# Patient Record
Sex: FEMALE | Race: WHITE | NOT HISPANIC OR LATINO | Employment: FULL TIME | URBAN - METROPOLITAN AREA
[De-identification: names, ages, dates, MRNs, and addresses within clinical notes are randomized per-mention and may not be internally consistent; named-entity substitution may affect disease eponyms.]

---

## 2023-10-29 ENCOUNTER — HOSPITAL ENCOUNTER (EMERGENCY)
Facility: HOSPITAL | Age: 48
Discharge: HOME | End: 2023-10-30
Attending: INTERNAL MEDICINE
Payer: COMMERCIAL

## 2023-10-29 DIAGNOSIS — G43.809 OTHER MIGRAINE WITHOUT STATUS MIGRAINOSUS, NOT INTRACTABLE: Primary | ICD-10-CM

## 2023-10-29 PROCEDURE — 99284 EMERGENCY DEPT VISIT MOD MDM: CPT | Performed by: INTERNAL MEDICINE

## 2023-10-29 PROCEDURE — 99283 EMERGENCY DEPT VISIT LOW MDM: CPT | Mod: 25 | Performed by: INTERNAL MEDICINE

## 2023-10-29 PROCEDURE — 96372 THER/PROPH/DIAG INJ SC/IM: CPT | Performed by: INTERNAL MEDICINE

## 2023-10-29 RX ORDER — METOCLOPRAMIDE 10 MG/1
10 TABLET ORAL ONCE
Status: COMPLETED | OUTPATIENT
Start: 2023-10-30 | End: 2023-10-30

## 2023-10-29 RX ORDER — ACETAMINOPHEN 325 MG/1
975 TABLET ORAL ONCE
Status: COMPLETED | OUTPATIENT
Start: 2023-10-30 | End: 2023-10-30

## 2023-10-29 RX ORDER — KETOROLAC TROMETHAMINE 30 MG/ML
30 INJECTION, SOLUTION INTRAMUSCULAR; INTRAVENOUS ONCE
Status: COMPLETED | OUTPATIENT
Start: 2023-10-30 | End: 2023-10-30

## 2023-10-29 ASSESSMENT — COLUMBIA-SUICIDE SEVERITY RATING SCALE - C-SSRS
6. HAVE YOU EVER DONE ANYTHING, STARTED TO DO ANYTHING, OR PREPARED TO DO ANYTHING TO END YOUR LIFE?: NO
2. HAVE YOU ACTUALLY HAD ANY THOUGHTS OF KILLING YOURSELF?: NO
1. IN THE PAST MONTH, HAVE YOU WISHED YOU WERE DEAD OR WISHED YOU COULD GO TO SLEEP AND NOT WAKE UP?: NO

## 2023-10-30 VITALS
DIASTOLIC BLOOD PRESSURE: 101 MMHG | BODY MASS INDEX: 29.88 KG/M2 | TEMPERATURE: 97 F | RESPIRATION RATE: 17 BRPM | HEIGHT: 64 IN | OXYGEN SATURATION: 99 % | SYSTOLIC BLOOD PRESSURE: 152 MMHG | WEIGHT: 175 LBS | HEART RATE: 58 BPM

## 2023-10-30 PROCEDURE — 2500000004 HC RX 250 GENERAL PHARMACY W/ HCPCS (ALT 636 FOR OP/ED): Performed by: INTERNAL MEDICINE

## 2023-10-30 PROCEDURE — 2500000001 HC RX 250 WO HCPCS SELF ADMINISTERED DRUGS (ALT 637 FOR MEDICARE OP): Performed by: INTERNAL MEDICINE

## 2023-10-30 RX ORDER — METOCLOPRAMIDE 10 MG/1
10 TABLET ORAL 3 TIMES DAILY PRN
Qty: 15 TABLET | Refills: 0 | Status: SHIPPED | OUTPATIENT
Start: 2023-10-30 | End: 2023-11-06

## 2023-10-30 RX ADMIN — ACETAMINOPHEN 975 MG: 325 TABLET ORAL at 00:34

## 2023-10-30 RX ADMIN — METOCLOPRAMIDE 10 MG: 10 TABLET ORAL at 00:35

## 2023-10-30 RX ADMIN — KETOROLAC TROMETHAMINE 30 MG: 30 INJECTION, SOLUTION INTRAMUSCULAR at 00:35

## 2023-10-30 RX ADMIN — DEXAMETHASONE 10 MG: 6 TABLET ORAL at 00:34

## 2023-10-30 ASSESSMENT — PAIN - FUNCTIONAL ASSESSMENT
PAIN_FUNCTIONAL_ASSESSMENT: 0-10
PAIN_FUNCTIONAL_ASSESSMENT: 0-10

## 2023-10-30 ASSESSMENT — PAIN SCALES - GENERAL
PAINLEVEL_OUTOF10: 2
PAINLEVEL_OUTOF10: 5 - MODERATE PAIN

## 2023-10-30 ASSESSMENT — LIFESTYLE VARIABLES
EVER HAD A DRINK FIRST THING IN THE MORNING TO STEADY YOUR NERVES TO GET RID OF A HANGOVER: NO
REASON UNABLE TO ASSESS: NO
HAVE YOU EVER FELT YOU SHOULD CUT DOWN ON YOUR DRINKING: NO
HAVE PEOPLE ANNOYED YOU BY CRITICIZING YOUR DRINKING: NO
EVER FELT BAD OR GUILTY ABOUT YOUR DRINKING: NO

## 2023-10-30 ASSESSMENT — PAIN DESCRIPTION - PROGRESSION: CLINICAL_PROGRESSION: NOT CHANGED

## 2023-10-30 NOTE — DISCHARGE INSTRUCTIONS
Please follow-up with your neurologist.  If having worsening symptoms, intractable nausea vomiting, stiff neck, fever, vision change, or other concerns please return to the emergency room for further evaluation.

## 2023-10-30 NOTE — ED PROVIDER NOTES
"HPI   Chief Complaint   Patient presents with    Hypertension     Pt presents to the ED with c/o hypertension. No longer takes BP medication. Also c/o migraine \"but without pain\"       Patient presenting for evaluation of headache.  Patient notes that she gets this discomfort with her migraine headaches.  Patient feels that the pressure on the right side of her face.  This is similar to previous migraine headaches.  Patient denies nausea or vomiting.  Patient denies halos or auras around lights.  Patient notes that she took a Nurtec yesterday as well as a triptan today.  Patient notes that these did not relieve the pain.  Patient denies recent illness.  Denies runny nose.  Denies sinus pain.  Denies urinary complaints.  Patient notes she had a hysterectomy.  Patient denies recent trauma.  Patient denies chest pain or shortness of breath.  Patient denies vision change.  Patient notes that she does take propranolol for headaches.  Patient did take her propranolol this morning.  Patient notes her blood pressure was elevated today.      History provided by:  Patient                      No data recorded                Patient History   No past medical history on file.  Past Surgical History:   Procedure Laterality Date    OTHER SURGICAL HISTORY  02/03/2020    Gallbladder surgery     No family history on file.  Social History     Tobacco Use    Smoking status: Not on file    Smokeless tobacco: Not on file   Substance Use Topics    Alcohol use: Not on file    Drug use: Not on file       Physical Exam   ED Triage Vitals [10/29/23 2337]   Temp Heart Rate Resp BP   36.1 °C (97 °F) 61 18 171/83      SpO2 Temp src Heart Rate Source Patient Position   98 % -- -- --      BP Location FiO2 (%)     -- --       Physical Exam  Vitals and nursing note reviewed.   Constitutional:       Appearance: Normal appearance.   HENT:      Head: Atraumatic.      Right Ear: External ear normal.      Left Ear: External ear normal.      Nose: Nose " normal.      Mouth/Throat:      Mouth: Mucous membranes are moist.   Eyes:      Extraocular Movements: Extraocular movements intact.      Pupils: Pupils are equal, round, and reactive to light.   Cardiovascular:      Rate and Rhythm: Normal rate and regular rhythm.      Pulses: Normal pulses.   Pulmonary:      Effort: Pulmonary effort is normal.      Breath sounds: Normal breath sounds.   Abdominal:      Palpations: Abdomen is soft.      Tenderness: There is no abdominal tenderness.   Musculoskeletal:         General: No tenderness. Normal range of motion.      Cervical back: Normal range of motion and neck supple. No rigidity or tenderness.   Skin:     General: Skin is warm and dry.   Neurological:      General: No focal deficit present.      Mental Status: She is alert and oriented to person, place, and time. Mental status is at baseline.      Cranial Nerves: Cranial nerves 2-12 are intact.      Sensory: Sensation is intact.      Motor: Motor function is intact.      Coordination: Coordination is intact.   Psychiatric:         Mood and Affect: Mood normal.         Behavior: Behavior normal.         ED Course & MDM   ED Course as of 10/30/23 0122   Mon Oct 30, 2023   0111 Reassessed patient and her headache is improved.  Blood pressure improved.  Patient notes she has previously been prescribed Reglan and Compazine as an additive to help with headache.  Patient states that she did miss her injection which was scheduled for yesterday.  She believes this is contributing to her headache.  Patient agrees to follow-up with her neurologist at the [facility] and return to ED if having worsening symptoms or other concerns. [JA]      ED Course User Index  [JA] Joss Rubin DO         Diagnoses as of 10/30/23 0122   Other migraine without status migrainosus, not intractable       Medical Decision Making  Presenting for evaluation of headache similar to her previous migraines.  No neurodeficit on exam.  Afebrile.  No  nuchal rigidity.  No recent infections.  No recent trauma.  Improved in the ED with treatment.  Patient also noted to have high blood pressure.  Blood pressure improved after treatment of the migraine headache.  Patient does take propranolol that is for her headaches.  Patient denies chest pain, shortness of breath, mental status changes or vision changes.  Patient agrees to follow-up with her neurologist as outpatient as she could not fill her injection for her headaches.  Patient was supposed to have her injection yesterday.  She believes is contributing to her headache.  Patient agrees to follow-up as outpatient return to ED if having worsening symptoms or other concerns.        Procedure  Procedures     Joss Rubin DO  10/30/23 0122

## 2024-02-15 ENCOUNTER — HOSPITAL ENCOUNTER (EMERGENCY)
Facility: HOSPITAL | Age: 49
Discharge: HOME | End: 2024-02-16
Payer: COMMERCIAL

## 2024-02-15 DIAGNOSIS — R51.9 ACUTE NONINTRACTABLE HEADACHE, UNSPECIFIED HEADACHE TYPE: Primary | ICD-10-CM

## 2024-02-15 PROBLEM — Z94.9 TRANSPLANT: Status: ACTIVE | Noted: 2024-02-15

## 2024-02-15 PROBLEM — K21.9 GASTROESOPHAGEAL REFLUX DISEASE: Status: ACTIVE | Noted: 2017-10-09

## 2024-02-15 PROBLEM — K59.00 CONSTIPATION: Status: ACTIVE | Noted: 2024-02-15

## 2024-02-15 PROBLEM — S83.232A COMPLEX TEAR OF MEDIAL MENISCUS OF LEFT KNEE AS CURRENT INJURY: Status: ACTIVE | Noted: 2024-02-15

## 2024-02-15 PROBLEM — Z52.4 DONOR OF KIDNEY FOR TRANSPLANT: Status: ACTIVE | Noted: 2024-02-15

## 2024-02-15 PROBLEM — I10 HYPERTENSION: Status: ACTIVE | Noted: 2018-01-15

## 2024-02-15 PROBLEM — F41.9 ANXIETY: Status: ACTIVE | Noted: 2023-03-27

## 2024-02-15 PROBLEM — G25.81 RESTLESS LEG SYNDROME: Status: ACTIVE | Noted: 2022-03-28

## 2024-02-15 PROBLEM — N95.8 GENITOURINARY SYNDROME OF MENOPAUSE: Status: ACTIVE | Noted: 2023-01-30

## 2024-02-15 PROBLEM — E11.9 CONTROLLED TYPE 2 DIABETES MELLITUS WITHOUT COMPLICATION, WITHOUT LONG-TERM CURRENT USE OF INSULIN (MULTI): Status: ACTIVE | Noted: 2018-01-15

## 2024-02-15 PROBLEM — R07.9 CHEST PAIN: Status: ACTIVE | Noted: 2019-08-08

## 2024-02-15 PROBLEM — E78.2 MIXED HYPERLIPIDEMIA: Status: ACTIVE | Noted: 2019-08-08

## 2024-02-15 PROBLEM — G43.909 MIGRAINE HEADACHE: Status: ACTIVE | Noted: 2024-02-15

## 2024-02-15 PROBLEM — R00.2 PALPITATIONS: Status: ACTIVE | Noted: 2024-02-15

## 2024-02-15 LAB
BASOPHILS # BLD AUTO: 0.05 X10*3/UL (ref 0–0.1)
BASOPHILS NFR BLD AUTO: 0.5 %
EOSINOPHIL # BLD AUTO: 0.33 X10*3/UL (ref 0–0.7)
EOSINOPHIL NFR BLD AUTO: 3.1 %
ERYTHROCYTE [DISTWIDTH] IN BLOOD BY AUTOMATED COUNT: 12.9 % (ref 11.5–14.5)
ERYTHROCYTE [SEDIMENTATION RATE] IN BLOOD BY WESTERGREN METHOD: 28 MM/H (ref 0–20)
HCT VFR BLD AUTO: 42 % (ref 36–46)
HGB BLD-MCNC: 13.8 G/DL (ref 12–16)
IMM GRANULOCYTES # BLD AUTO: 0.04 X10*3/UL (ref 0–0.7)
IMM GRANULOCYTES NFR BLD AUTO: 0.4 % (ref 0–0.9)
LYMPHOCYTES # BLD AUTO: 2.72 X10*3/UL (ref 1.2–4.8)
LYMPHOCYTES NFR BLD AUTO: 25.2 %
MCH RBC QN AUTO: 29.6 PG (ref 26–34)
MCHC RBC AUTO-ENTMCNC: 32.9 G/DL (ref 32–36)
MCV RBC AUTO: 90 FL (ref 80–100)
MONOCYTES # BLD AUTO: 0.52 X10*3/UL (ref 0.1–1)
MONOCYTES NFR BLD AUTO: 4.8 %
NEUTROPHILS # BLD AUTO: 7.12 X10*3/UL (ref 1.2–7.7)
NEUTROPHILS NFR BLD AUTO: 66 %
NRBC BLD-RTO: 0 /100 WBCS (ref 0–0)
PLATELET # BLD AUTO: 264 X10*3/UL (ref 150–450)
RBC # BLD AUTO: 4.67 X10*6/UL (ref 4–5.2)
WBC # BLD AUTO: 10.8 X10*3/UL (ref 4.4–11.3)

## 2024-02-15 PROCEDURE — 86140 C-REACTIVE PROTEIN: CPT | Performed by: NURSE PRACTITIONER

## 2024-02-15 PROCEDURE — 80053 COMPREHEN METABOLIC PANEL: CPT | Performed by: NURSE PRACTITIONER

## 2024-02-15 PROCEDURE — 85652 RBC SED RATE AUTOMATED: CPT | Performed by: NURSE PRACTITIONER

## 2024-02-15 PROCEDURE — 99284 EMERGENCY DEPT VISIT MOD MDM: CPT

## 2024-02-15 PROCEDURE — 85025 COMPLETE CBC W/AUTO DIFF WBC: CPT | Performed by: NURSE PRACTITIONER

## 2024-02-15 PROCEDURE — 36415 COLL VENOUS BLD VENIPUNCTURE: CPT | Performed by: NURSE PRACTITIONER

## 2024-02-15 ASSESSMENT — PAIN - FUNCTIONAL ASSESSMENT: PAIN_FUNCTIONAL_ASSESSMENT: 0-10

## 2024-02-15 ASSESSMENT — COLUMBIA-SUICIDE SEVERITY RATING SCALE - C-SSRS
1. IN THE PAST MONTH, HAVE YOU WISHED YOU WERE DEAD OR WISHED YOU COULD GO TO SLEEP AND NOT WAKE UP?: NO
2. HAVE YOU ACTUALLY HAD ANY THOUGHTS OF KILLING YOURSELF?: NO
6. HAVE YOU EVER DONE ANYTHING, STARTED TO DO ANYTHING, OR PREPARED TO DO ANYTHING TO END YOUR LIFE?: NO

## 2024-02-15 ASSESSMENT — PAIN SCALES - GENERAL: PAINLEVEL_OUTOF10: 8

## 2024-02-16 ENCOUNTER — APPOINTMENT (OUTPATIENT)
Dept: RADIOLOGY | Facility: HOSPITAL | Age: 49
End: 2024-02-16
Payer: COMMERCIAL

## 2024-02-16 VITALS
WEIGHT: 190 LBS | DIASTOLIC BLOOD PRESSURE: 89 MMHG | RESPIRATION RATE: 17 BRPM | BODY MASS INDEX: 32.44 KG/M2 | HEART RATE: 74 BPM | SYSTOLIC BLOOD PRESSURE: 159 MMHG | OXYGEN SATURATION: 99 % | HEIGHT: 64 IN | TEMPERATURE: 97.7 F

## 2024-02-16 LAB
ALBUMIN SERPL BCP-MCNC: 4.5 G/DL (ref 3.4–5)
ALP SERPL-CCNC: 50 U/L (ref 33–110)
ALT SERPL W P-5'-P-CCNC: 20 U/L (ref 7–45)
ANION GAP SERPL CALC-SCNC: 13 MMOL/L (ref 10–20)
AST SERPL W P-5'-P-CCNC: 41 U/L (ref 9–39)
BILIRUB SERPL-MCNC: 0.4 MG/DL (ref 0–1.2)
BUN SERPL-MCNC: 14 MG/DL (ref 6–23)
CALCIUM SERPL-MCNC: 9.3 MG/DL (ref 8.6–10.3)
CHLORIDE SERPL-SCNC: 101 MMOL/L (ref 98–107)
CO2 SERPL-SCNC: 28 MMOL/L (ref 21–32)
CREAT SERPL-MCNC: 0.78 MG/DL (ref 0.5–1.05)
CRP SERPL-MCNC: 1.04 MG/DL
EGFRCR SERPLBLD CKD-EPI 2021: >90 ML/MIN/1.73M*2
GLUCOSE SERPL-MCNC: 141 MG/DL (ref 74–99)
POTASSIUM SERPL-SCNC: 3.8 MMOL/L (ref 3.5–5.3)
POTASSIUM SERPL-SCNC: 5.6 MMOL/L (ref 3.5–5.3)
PROT SERPL-MCNC: 8.2 G/DL (ref 6.4–8.2)
SODIUM SERPL-SCNC: 136 MMOL/L (ref 136–145)

## 2024-02-16 PROCEDURE — 2500000001 HC RX 250 WO HCPCS SELF ADMINISTERED DRUGS (ALT 637 FOR MEDICARE OP): Performed by: NURSE PRACTITIONER

## 2024-02-16 PROCEDURE — 70450 CT HEAD/BRAIN W/O DYE: CPT | Performed by: STUDENT IN AN ORGANIZED HEALTH CARE EDUCATION/TRAINING PROGRAM

## 2024-02-16 PROCEDURE — 70450 CT HEAD/BRAIN W/O DYE: CPT

## 2024-02-16 PROCEDURE — 84132 ASSAY OF SERUM POTASSIUM: CPT | Performed by: NURSE PRACTITIONER

## 2024-02-16 PROCEDURE — 36415 COLL VENOUS BLD VENIPUNCTURE: CPT | Performed by: NURSE PRACTITIONER

## 2024-02-16 RX ORDER — IBUPROFEN 600 MG/1
600 TABLET ORAL ONCE
Status: COMPLETED | OUTPATIENT
Start: 2024-02-16 | End: 2024-02-16

## 2024-02-16 RX ADMIN — IBUPROFEN 600 MG: 600 TABLET, FILM COATED ORAL at 01:21

## 2024-02-16 ASSESSMENT — LIFESTYLE VARIABLES
HAVE YOU EVER FELT YOU SHOULD CUT DOWN ON YOUR DRINKING: NO
HAVE PEOPLE ANNOYED YOU BY CRITICIZING YOUR DRINKING: NO
EVER FELT BAD OR GUILTY ABOUT YOUR DRINKING: NO
EVER HAD A DRINK FIRST THING IN THE MORNING TO STEADY YOUR NERVES TO GET RID OF A HANGOVER: NO

## 2024-02-16 ASSESSMENT — PAIN DESCRIPTION - LOCATION: LOCATION: HEAD

## 2024-02-16 ASSESSMENT — PAIN SCALES - GENERAL: PAINLEVEL_OUTOF10: 6

## 2024-02-16 ASSESSMENT — PAIN - FUNCTIONAL ASSESSMENT: PAIN_FUNCTIONAL_ASSESSMENT: 0-10

## 2024-02-16 NOTE — ED PROVIDER NOTES
HPI   Chief Complaint   Patient presents with    Headache       Patient is a healthy nontoxic-appearing 48-year-old female with past medical history of anxiety, migraine headaches, constipation, type 2 diabetes, donor kidney for transplant, esophageal reflux, hypertension, hyperlipidemia, palpitations, presents to the emergency today for complaint of pain behind the right ear.  Patient states she has a history of migraines and is not currently experiencing headache pain.  Patient states earlier today she developed pain behind the right ear and is concerned she may be experiencing an infection.  Patient denies any injuries trauma or falls, visual disturbances, numbness or tingling.  Patient denies any ear pain, hearing changes, drainage from the ear.  Patient denies chest pain, shortness of breath difficulty breathing, abdominal pain with nausea vomiting, diarrhea or constipation.  Patient denies contact with known ill individuals or recent travel.                          [place] Coma Scale Score: 15                     Patient History   History reviewed. No pertinent past medical history.  Past Surgical History:   Procedure Laterality Date    OTHER SURGICAL HISTORY  02/03/2020    Gallbladder surgery     No family history on file.  Social History     Tobacco Use    Smoking status: Not on file    Smokeless tobacco: Not on file   Substance Use Topics    Alcohol use: Not on file    Drug use: Not on file       Physical Exam   ED Triage Vitals [02/15/24 2256]   Temperature Heart Rate Respirations BP   36.5 °C (97.7 °F) 72 18 (!) 174/106      Pulse Ox Temp src Heart Rate Source Patient Position   100 % -- -- --      BP Location FiO2 (%)     -- --       Physical Exam  Vitals and nursing note reviewed.   Constitutional:       General: She is not in acute distress.     Appearance: Normal appearance. She is well-developed and normal weight. She is not ill-appearing, toxic-appearing or diaphoretic.   HENT:      Head:  Normocephalic and atraumatic.      Right Ear: Tympanic membrane, ear canal and external ear normal.      Left Ear: Tympanic membrane, ear canal and external ear normal.      Nose: Congestion and rhinorrhea present.      Mouth/Throat:      Mouth: Mucous membranes are moist.      Pharynx: Posterior oropharyngeal erythema present. No oropharyngeal exudate.   Eyes:      General: No visual field deficit or scleral icterus.        Right eye: No discharge.         Left eye: No discharge.      Extraocular Movements: Extraocular movements intact.      Right eye: Normal extraocular motion and no nystagmus.      Left eye: Normal extraocular motion and no nystagmus.      Conjunctiva/sclera: Conjunctivae normal.      Pupils: Pupils are equal, round, and reactive to light. Pupils are equal.      Right eye: Pupil is round and reactive.      Left eye: Pupil is round and reactive.   Neck:      Vascular: No carotid bruit.   Cardiovascular:      Rate and Rhythm: Normal rate and regular rhythm.      Pulses: Normal pulses.      Heart sounds: Normal heart sounds. No murmur heard.     No friction rub. No gallop.   Pulmonary:      Effort: Pulmonary effort is normal. No respiratory distress.      Breath sounds: Normal breath sounds. No stridor. No wheezing, rhonchi or rales.   Chest:      Chest wall: No tenderness.   Musculoskeletal:         General: No swelling. Normal range of motion.      Cervical back: Normal range of motion and neck supple. No rigidity or tenderness.   Lymphadenopathy:      Cervical: No cervical adenopathy.   Skin:     General: Skin is warm and dry.      Capillary Refill: Capillary refill takes less than 2 seconds.   Neurological:      Mental Status: She is alert.      GCS: GCS eye subscore is 4. GCS verbal subscore is 5. GCS motor subscore is 6.      Cranial Nerves: No cranial nerve deficit, dysarthria or facial asymmetry.      Sensory: No sensory deficit.      Motor: No weakness.      Coordination: Romberg sign  negative. Coordination normal.      Gait: Gait normal.      Deep Tendon Reflexes: Reflexes normal.   Psychiatric:         Mood and Affect: Mood normal.         ED Course & MDM   Diagnoses as of 02/16/24 0307   Acute nonintractable headache, unspecified headache type       Medical Decision Making  Given patient's complaint and presentation a thorough exam was performed.  Patient remains neurologically intact no focal deficits, has no nuchal rigidity, remains hemodynamically stable during emergency evaluation, is afebrile, GCS 15, denies any current headache pain, visual disturbances, pupils equal active round bilaterally, Red light reflex is intact, EOMs are intact bilaterally no nystagmus diplopia, no ataxia or drift, face is symmetrical, tongue is midline, able to shrug shoulders bilaterally, no weakness present bilateral upper or lower extremities, no midline cervical, thoracic or lumbar spinal tenderness and crepitus, step-offs upon palpation, full range of motion neck without any difficulty, no reproducible tenderness to palpation over the mastoid bone, no reproducible tenderness palpation over the temple, TMs bilaterally appear normal and intact no hemotympanum or effusion, I have a low suspicion for acute cranial process, meningitis, mastoiditis, otitis media, otitis externa, temporal arteritis.  Lab work was obtained in the emergency room. Lab work reveals several irregularities including elevated sed rate of 28, CRP of 1.04.  Patient states headache has been ongoing.  CT of the head was performed.  CT of the head is interpreted by radiologist reveals no acute intracranial process.  Patient did receive ibuprofen in the emergency room for discomfort as well.  Reevaluation of patient reveals some improvement.  I suspect patient may be experiencing migraine headache at this time.  Patient requesting discharge home.  I encourage patient to monitor symptoms and if they become worse was given strict precautions  return to emergency room for further evaluation, otherwise follow-up with primary care provider as needed.  Patient was agreeable this plan and discharged home in stable condition.    LUIZA Tamayo     Portions of this note were generated using digital voice recognition software, and may contain grammatical errors    Procedure  Procedures     LUIZA Tamayo  02/16/24 0300

## 2024-04-17 ENCOUNTER — HOSPITAL ENCOUNTER (EMERGENCY)
Facility: HOSPITAL | Age: 49
Discharge: HOME | End: 2024-04-17
Attending: EMERGENCY MEDICINE
Payer: COMMERCIAL

## 2024-04-17 ENCOUNTER — APPOINTMENT (OUTPATIENT)
Dept: CARDIOLOGY | Facility: HOSPITAL | Age: 49
End: 2024-04-17
Payer: COMMERCIAL

## 2024-04-17 VITALS
BODY MASS INDEX: 32.44 KG/M2 | HEIGHT: 64 IN | WEIGHT: 190 LBS | HEART RATE: 71 BPM | DIASTOLIC BLOOD PRESSURE: 84 MMHG | OXYGEN SATURATION: 100 % | TEMPERATURE: 97 F | RESPIRATION RATE: 16 BRPM | SYSTOLIC BLOOD PRESSURE: 149 MMHG

## 2024-04-17 DIAGNOSIS — H81.399 PERIPHERAL VERTIGO, UNSPECIFIED LATERALITY: Primary | ICD-10-CM

## 2024-04-17 LAB
ALBUMIN SERPL BCP-MCNC: 4.4 G/DL (ref 3.4–5)
ALP SERPL-CCNC: 62 U/L (ref 33–110)
ALT SERPL W P-5'-P-CCNC: 25 U/L (ref 7–45)
ANION GAP SERPL CALC-SCNC: 13 MMOL/L (ref 10–20)
AST SERPL W P-5'-P-CCNC: 16 U/L (ref 9–39)
BASOPHILS # BLD AUTO: 0.04 X10*3/UL (ref 0–0.1)
BASOPHILS NFR BLD AUTO: 0.4 %
BILIRUB SERPL-MCNC: 0.4 MG/DL (ref 0–1.2)
BUN SERPL-MCNC: 14 MG/DL (ref 6–23)
CALCIUM SERPL-MCNC: 9.3 MG/DL (ref 8.6–10.3)
CHLORIDE SERPL-SCNC: 100 MMOL/L (ref 98–107)
CO2 SERPL-SCNC: 28 MMOL/L (ref 21–32)
CREAT SERPL-MCNC: 0.89 MG/DL (ref 0.5–1.05)
EGFRCR SERPLBLD CKD-EPI 2021: 81 ML/MIN/1.73M*2
EOSINOPHIL # BLD AUTO: 0.28 X10*3/UL (ref 0–0.7)
EOSINOPHIL NFR BLD AUTO: 2.9 %
ERYTHROCYTE [DISTWIDTH] IN BLOOD BY AUTOMATED COUNT: 12.8 % (ref 11.5–14.5)
GLUCOSE SERPL-MCNC: 204 MG/DL (ref 74–99)
HCT VFR BLD AUTO: 38.5 % (ref 36–46)
HGB BLD-MCNC: 12.4 G/DL (ref 12–16)
IMM GRANULOCYTES # BLD AUTO: 0.02 X10*3/UL (ref 0–0.7)
IMM GRANULOCYTES NFR BLD AUTO: 0.2 % (ref 0–0.9)
LYMPHOCYTES # BLD AUTO: 2.08 X10*3/UL (ref 1.2–4.8)
LYMPHOCYTES NFR BLD AUTO: 21.8 %
MAGNESIUM SERPL-MCNC: 1.58 MG/DL (ref 1.6–2.4)
MCH RBC QN AUTO: 28.8 PG (ref 26–34)
MCHC RBC AUTO-ENTMCNC: 32.2 G/DL (ref 32–36)
MCV RBC AUTO: 89 FL (ref 80–100)
MONOCYTES # BLD AUTO: 0.38 X10*3/UL (ref 0.1–1)
MONOCYTES NFR BLD AUTO: 4 %
NEUTROPHILS # BLD AUTO: 6.73 X10*3/UL (ref 1.2–7.7)
NEUTROPHILS NFR BLD AUTO: 70.7 %
NRBC BLD-RTO: 0 /100 WBCS (ref 0–0)
PHOSPHATE SERPL-MCNC: 2.6 MG/DL (ref 2.5–4.9)
PLATELET # BLD AUTO: 245 X10*3/UL (ref 150–450)
POTASSIUM SERPL-SCNC: 3.5 MMOL/L (ref 3.5–5.3)
PROT SERPL-MCNC: 7.7 G/DL (ref 6.4–8.2)
RBC # BLD AUTO: 4.31 X10*6/UL (ref 4–5.2)
SODIUM SERPL-SCNC: 137 MMOL/L (ref 136–145)
WBC # BLD AUTO: 9.5 X10*3/UL (ref 4.4–11.3)

## 2024-04-17 PROCEDURE — 36415 COLL VENOUS BLD VENIPUNCTURE: CPT | Performed by: STUDENT IN AN ORGANIZED HEALTH CARE EDUCATION/TRAINING PROGRAM

## 2024-04-17 PROCEDURE — 99283 EMERGENCY DEPT VISIT LOW MDM: CPT | Mod: 25

## 2024-04-17 PROCEDURE — 2500000001 HC RX 250 WO HCPCS SELF ADMINISTERED DRUGS (ALT 637 FOR MEDICARE OP): Performed by: STUDENT IN AN ORGANIZED HEALTH CARE EDUCATION/TRAINING PROGRAM

## 2024-04-17 PROCEDURE — 2500000004 HC RX 250 GENERAL PHARMACY W/ HCPCS (ALT 636 FOR OP/ED): Performed by: STUDENT IN AN ORGANIZED HEALTH CARE EDUCATION/TRAINING PROGRAM

## 2024-04-17 PROCEDURE — 84100 ASSAY OF PHOSPHORUS: CPT | Performed by: STUDENT IN AN ORGANIZED HEALTH CARE EDUCATION/TRAINING PROGRAM

## 2024-04-17 PROCEDURE — 83735 ASSAY OF MAGNESIUM: CPT | Performed by: STUDENT IN AN ORGANIZED HEALTH CARE EDUCATION/TRAINING PROGRAM

## 2024-04-17 PROCEDURE — 80053 COMPREHEN METABOLIC PANEL: CPT | Performed by: STUDENT IN AN ORGANIZED HEALTH CARE EDUCATION/TRAINING PROGRAM

## 2024-04-17 PROCEDURE — 85025 COMPLETE CBC W/AUTO DIFF WBC: CPT | Performed by: STUDENT IN AN ORGANIZED HEALTH CARE EDUCATION/TRAINING PROGRAM

## 2024-04-17 PROCEDURE — 93005 ELECTROCARDIOGRAM TRACING: CPT

## 2024-04-17 PROCEDURE — 96360 HYDRATION IV INFUSION INIT: CPT

## 2024-04-17 RX ORDER — PHENTERMINE HYDROCHLORIDE 8 MG/1
8 TABLET ORAL DAILY
COMMUNITY

## 2024-04-17 RX ORDER — MECLIZINE HYDROCHLORIDE 25 MG/1
25 TABLET ORAL ONCE
Status: COMPLETED | OUTPATIENT
Start: 2024-04-17 | End: 2024-04-17

## 2024-04-17 RX ORDER — ACETAMINOPHEN 325 MG/1
975 TABLET ORAL ONCE
Status: COMPLETED | OUTPATIENT
Start: 2024-04-17 | End: 2024-04-17

## 2024-04-17 RX ORDER — LANOLIN ALCOHOL/MO/W.PET/CERES
800 CREAM (GRAM) TOPICAL ONCE
Status: COMPLETED | OUTPATIENT
Start: 2024-04-17 | End: 2024-04-17

## 2024-04-17 RX ORDER — VALSARTAN 40 MG/1
40 TABLET ORAL DAILY
COMMUNITY

## 2024-04-17 RX ORDER — CALCIUM CARBONATE 300MG(750)
400 TABLET,CHEWABLE ORAL DAILY
Qty: 30 TABLET | Refills: 0 | Status: SHIPPED | OUTPATIENT
Start: 2024-04-17 | End: 2024-05-17

## 2024-04-17 RX ADMIN — MAGNESIUM OXIDE TAB 400 MG (241.3 MG ELEMENTAL MG) 800 MG: 400 (241.3 MG) TAB at 22:18

## 2024-04-17 RX ADMIN — SODIUM CHLORIDE, POTASSIUM CHLORIDE, SODIUM LACTATE AND CALCIUM CHLORIDE 1000 ML: 600; 310; 30; 20 INJECTION, SOLUTION INTRAVENOUS at 21:20

## 2024-04-17 RX ADMIN — MECLIZINE HYDROCHLORIDE 25 MG: 25 TABLET ORAL at 21:20

## 2024-04-17 RX ADMIN — ACETAMINOPHEN 975 MG: 325 TABLET ORAL at 21:20

## 2024-04-17 ASSESSMENT — COLUMBIA-SUICIDE SEVERITY RATING SCALE - C-SSRS
1. IN THE PAST MONTH, HAVE YOU WISHED YOU WERE DEAD OR WISHED YOU COULD GO TO SLEEP AND NOT WAKE UP?: NO
6. HAVE YOU EVER DONE ANYTHING, STARTED TO DO ANYTHING, OR PREPARED TO DO ANYTHING TO END YOUR LIFE?: NO
2. HAVE YOU ACTUALLY HAD ANY THOUGHTS OF KILLING YOURSELF?: NO

## 2024-04-17 NOTE — ED TRIAGE NOTES
Pt arrived to the ED  wit c/o right side facial numbness and lightheaded. Pt states this has been going on for the last couple of hours.

## 2024-04-18 LAB
ATRIAL RATE: 56 BPM
P AXIS: 35 DEGREES
P OFFSET: 175 MS
P ONSET: 127 MS
PR INTERVAL: 182 MS
Q ONSET: 218 MS
QRS COUNT: 9 BEATS
QRS DURATION: 80 MS
QT INTERVAL: 426 MS
QTC CALCULATION(BAZETT): 411 MS
QTC FREDERICIA: 416 MS
R AXIS: -10 DEGREES
T AXIS: -31 DEGREES
T OFFSET: 431 MS
VENTRICULAR RATE: 56 BPM

## 2024-04-18 NOTE — ED PROVIDER NOTES
HPI   Chief Complaint   Patient presents with   • Dizziness     Pt arrived to the ED  wit c/o right side facial numbness and lightheaded. Pt states this has been going on for the last couple of hours.    • Numbness       HPI  48-year-old female with history of atypical migraine, anxiety, T2DM, hypertension who presents for lightheadedness and right facial numbness.  She states she first noticed the symptoms around 1800.  She denies any recent changes to her health other than recently starting phentermine which she takes for weight loss.  Denies any chest pain, shortness of breath, headache, blurry or double vision.  She states symptoms are worsened by position change.                  [place] Coma Scale Score: 15                     Patient History   History reviewed. No pertinent past medical history.  Past Surgical History:   Procedure Laterality Date   • OTHER SURGICAL HISTORY  02/03/2020    Gallbladder surgery     No family history on file.  Social History     Tobacco Use   • Smoking status: Every Day     Types: Cigarettes   • Smokeless tobacco: Never   Substance Use Topics   • Alcohol use: Never   • Drug use: Never       Physical Exam   ED Triage Vitals [04/17/24 1926]   Temperature Heart Rate Respirations BP   36.1 °C (97 °F) 62 16 (!) 189/109      Pulse Ox Temp Source Heart Rate Source Patient Position   99 % Temporal -- --      BP Location FiO2 (%)     -- --       Physical Exam  Vitals and nursing note reviewed.   Constitutional:       Appearance: Normal appearance.   HENT:      Head: Normocephalic.      Mouth/Throat:      Mouth: Mucous membranes are moist.   Eyes:      Conjunctiva/sclera: Conjunctivae normal.   Cardiovascular:      Rate and Rhythm: Normal rate.   Pulmonary:      Effort: Pulmonary effort is normal.   Abdominal:      General: Abdomen is flat.   Musculoskeletal:         General: No deformity.      Right lower leg: No edema.      Left lower leg: No edema.   Skin:     General: Skin is warm and  dry.   Neurological:      Mental Status: She is alert and oriented to person, place, and time.      Comments: Alert, oriented to person place time and situation, follows commands, moves all 4 extremities with symmetric 5-5 strength, no facial droop, no dysarthria, no aphasia, normal extraocular motion, sensation grossly intact throughout, normal gait, normal heel shin testing, normal finger-nose testing, no visual field cut   Psychiatric:         Mood and Affect: Mood normal.         ED Course & MDM   ED Course as of 04/17/24 2211 Wed Apr 17, 2024 2154 MAGNESIUM(!): 1.58  Replaced [ROOSEVELT]      ED Course User Index  [ROOSEVELT] Joss Deleon, DO         Diagnoses as of 04/17/24 2211   Peripheral vertigo, unspecified laterality       Medical Decision Making  48-year-old female with history of atypical migraine, anxiety, T2DM, hypertension who presents for lightheadedness and right facial numbness that began at 1800 this evening.  Only recent change to her health is initiation of a recent weight loss drug called phentermine.  On exam, patient is hypertensive, otherwise vitals normal, she is in no acute distress and nontoxic-appearing, she is neurologically intact including no dysmetria, no dysdiadochokinesia, no dysphagia, no dysarthria, no visual field cut.  Patient has normal sensation in V1, V2, V3 distributions and otherwise sensation is grossly intact throughout on my exam.  Symptoms are exacerbated with change in position on my exam consistent with more likely peripheral vertigo.  Epley maneuver attempted unsuccessfully.  We did proceed with basic labs and empirically treated with IV fluids, Tylenol, meclizine.    Blood work showed hyperglycemia as well as mild hypomagnesemia which was replaced.  On reassessment, patient's symptoms had improved.  I suspect peripheral vertigo and/or atypical migraine as a source of her symptoms and no suspicion for posterior circulation stroke.  She was given information on performing  the Epley maneuver at home, she felt well and was discharged home in stable condition.    Procedure  Procedures     Joss Deleon, DO  Resident  04/17/24 9964

## 2024-12-23 ENCOUNTER — APPOINTMENT (OUTPATIENT)
Dept: RADIOLOGY | Facility: HOSPITAL | Age: 49
End: 2024-12-23
Payer: COMMERCIAL

## 2024-12-23 ENCOUNTER — HOSPITAL ENCOUNTER (EMERGENCY)
Facility: HOSPITAL | Age: 49
Discharge: HOME | End: 2024-12-23
Attending: EMERGENCY MEDICINE
Payer: COMMERCIAL

## 2024-12-23 ENCOUNTER — APPOINTMENT (OUTPATIENT)
Dept: CARDIOLOGY | Facility: HOSPITAL | Age: 49
End: 2024-12-23
Payer: COMMERCIAL

## 2024-12-23 ENCOUNTER — HOSPITAL ENCOUNTER (EMERGENCY)
Dept: CARDIOLOGY | Facility: HOSPITAL | Age: 49
Discharge: HOME | End: 2024-12-23
Payer: COMMERCIAL

## 2024-12-23 VITALS
DIASTOLIC BLOOD PRESSURE: 100 MMHG | WEIGHT: 190 LBS | OXYGEN SATURATION: 98 % | BODY MASS INDEX: 32.44 KG/M2 | RESPIRATION RATE: 20 BRPM | HEIGHT: 64 IN | SYSTOLIC BLOOD PRESSURE: 158 MMHG | TEMPERATURE: 98.1 F | HEART RATE: 118 BPM

## 2024-12-23 DIAGNOSIS — R10.13 EPIGASTRIC PAIN: Primary | ICD-10-CM

## 2024-12-23 DIAGNOSIS — R07.9 CHEST PAIN, UNSPECIFIED: ICD-10-CM

## 2024-12-23 LAB
ALBUMIN SERPL BCP-MCNC: 4.5 G/DL (ref 3.4–5)
ALP SERPL-CCNC: 51 U/L (ref 33–110)
ALT SERPL W P-5'-P-CCNC: 16 U/L (ref 7–45)
ANION GAP SERPL CALC-SCNC: 13 MMOL/L (ref 10–20)
AST SERPL W P-5'-P-CCNC: 14 U/L (ref 9–39)
BASOPHILS # BLD AUTO: 0.01 X10*3/UL (ref 0–0.1)
BASOPHILS NFR BLD AUTO: 0.1 %
BILIRUB SERPL-MCNC: 0.8 MG/DL (ref 0–1.2)
BUN SERPL-MCNC: 16 MG/DL (ref 6–23)
CALCIUM SERPL-MCNC: 8.9 MG/DL (ref 8.6–10.3)
CARDIAC TROPONIN I PNL SERPL HS: <3 NG/L (ref 0–13)
CHLORIDE SERPL-SCNC: 101 MMOL/L (ref 98–107)
CO2 SERPL-SCNC: 24 MMOL/L (ref 21–32)
CREAT SERPL-MCNC: 0.71 MG/DL (ref 0.5–1.05)
EGFRCR SERPLBLD CKD-EPI 2021: >90 ML/MIN/1.73M*2
EOSINOPHIL # BLD AUTO: 0.09 X10*3/UL (ref 0–0.7)
EOSINOPHIL NFR BLD AUTO: 0.9 %
ERYTHROCYTE [DISTWIDTH] IN BLOOD BY AUTOMATED COUNT: 13.4 % (ref 11.5–14.5)
GLUCOSE SERPL-MCNC: 163 MG/DL (ref 74–99)
HCT VFR BLD AUTO: 41.6 % (ref 36–46)
HGB BLD-MCNC: 13.8 G/DL (ref 12–16)
IMM GRANULOCYTES # BLD AUTO: 0.05 X10*3/UL (ref 0–0.7)
IMM GRANULOCYTES NFR BLD AUTO: 0.5 % (ref 0–0.9)
LIPASE SERPL-CCNC: 33 U/L (ref 9–82)
LYMPHOCYTES # BLD AUTO: 1.04 X10*3/UL (ref 1.2–4.8)
LYMPHOCYTES NFR BLD AUTO: 10 %
MCH RBC QN AUTO: 28.8 PG (ref 26–34)
MCHC RBC AUTO-ENTMCNC: 33.2 G/DL (ref 32–36)
MCV RBC AUTO: 87 FL (ref 80–100)
MONOCYTES # BLD AUTO: 0.42 X10*3/UL (ref 0.1–1)
MONOCYTES NFR BLD AUTO: 4 %
NEUTROPHILS # BLD AUTO: 8.81 X10*3/UL (ref 1.2–7.7)
NEUTROPHILS NFR BLD AUTO: 84.5 %
NRBC BLD-RTO: 0 /100 WBCS (ref 0–0)
PLATELET # BLD AUTO: 228 X10*3/UL (ref 150–450)
POTASSIUM SERPL-SCNC: 3.5 MMOL/L (ref 3.5–5.3)
PROT SERPL-MCNC: 7.7 G/DL (ref 6.4–8.2)
RBC # BLD AUTO: 4.8 X10*6/UL (ref 4–5.2)
SODIUM SERPL-SCNC: 134 MMOL/L (ref 136–145)
WBC # BLD AUTO: 10.4 X10*3/UL (ref 4.4–11.3)

## 2024-12-23 PROCEDURE — 2500000001 HC RX 250 WO HCPCS SELF ADMINISTERED DRUGS (ALT 637 FOR MEDICARE OP)

## 2024-12-23 PROCEDURE — 99285 EMERGENCY DEPT VISIT HI MDM: CPT | Mod: 25 | Performed by: EMERGENCY MEDICINE

## 2024-12-23 PROCEDURE — 2500000005 HC RX 250 GENERAL PHARMACY W/O HCPCS

## 2024-12-23 PROCEDURE — 80053 COMPREHEN METABOLIC PANEL: CPT

## 2024-12-23 PROCEDURE — 2500000004 HC RX 250 GENERAL PHARMACY W/ HCPCS (ALT 636 FOR OP/ED)

## 2024-12-23 PROCEDURE — 74176 CT ABD & PELVIS W/O CONTRAST: CPT

## 2024-12-23 PROCEDURE — 83690 ASSAY OF LIPASE: CPT

## 2024-12-23 PROCEDURE — 2500000002 HC RX 250 W HCPCS SELF ADMINISTERED DRUGS (ALT 637 FOR MEDICARE OP, ALT 636 FOR OP/ED)

## 2024-12-23 PROCEDURE — 93005 ELECTROCARDIOGRAM TRACING: CPT

## 2024-12-23 PROCEDURE — 84484 ASSAY OF TROPONIN QUANT: CPT | Performed by: EMERGENCY MEDICINE

## 2024-12-23 PROCEDURE — 85025 COMPLETE CBC W/AUTO DIFF WBC: CPT

## 2024-12-23 PROCEDURE — 36415 COLL VENOUS BLD VENIPUNCTURE: CPT

## 2024-12-23 PROCEDURE — 74176 CT ABD & PELVIS W/O CONTRAST: CPT | Performed by: RADIOLOGY

## 2024-12-23 RX ORDER — FAMOTIDINE 20 MG/1
20 TABLET, FILM COATED ORAL 2 TIMES DAILY
Qty: 30 TABLET | Refills: 0 | Status: SHIPPED | OUTPATIENT
Start: 2024-12-23 | End: 2025-01-07

## 2024-12-23 RX ORDER — ONDANSETRON 4 MG/1
4 TABLET, ORALLY DISINTEGRATING ORAL ONCE
Status: COMPLETED | OUTPATIENT
Start: 2024-12-23 | End: 2024-12-23

## 2024-12-23 RX ORDER — FAMOTIDINE 20 MG/1
20 TABLET, FILM COATED ORAL ONCE
Status: COMPLETED | OUTPATIENT
Start: 2024-12-23 | End: 2024-12-23

## 2024-12-23 ASSESSMENT — COLUMBIA-SUICIDE SEVERITY RATING SCALE - C-SSRS
6. HAVE YOU EVER DONE ANYTHING, STARTED TO DO ANYTHING, OR PREPARED TO DO ANYTHING TO END YOUR LIFE?: NO
1. IN THE PAST MONTH, HAVE YOU WISHED YOU WERE DEAD OR WISHED YOU COULD GO TO SLEEP AND NOT WAKE UP?: NO
2. HAVE YOU ACTUALLY HAD ANY THOUGHTS OF KILLING YOURSELF?: NO

## 2024-12-23 NOTE — DISCHARGE INSTRUCTIONS
You were seen in the emergency department for abdominal pain and all of the lab tests and CT imaging did not reveal any abnormalities.  We recommend following up with your primary care provider if symptoms continue, for further evaluation.  Recommend to keep taking her PPI as prescribed and we have written you a prescription for Pepcid for further symptom relief.  Please come back to the emergency department if the pain is worsening or you have any concerns.

## 2024-12-23 NOTE — ED TRIAGE NOTES
Pt presents to ED c/o epigastric abdominal pain that began last night. Pt reports n/v/d and indigestion. Pt denies CP, SOB. Pt reports feeling heart racing.

## 2024-12-23 NOTE — ED PROVIDER NOTES
Emergency Department Provider Note        History of Present Illness     History provided by: Patient  Limitations to History: None  External Records Reviewed with Brief Summary: None    HPI:  Mallory Casillas is a 49 y.o. female presenting with one day history of abdominal pain, N/V/D.  She reports she had sudden onset of nausea vomiting that started last night around 10 PM.  She only had that single episode of vomiting.  She had a pizza couple hours before this all started. She also reports she started having diarrhea and epigastric pain at the same time.  Also describes feeling like her heart is racing.  But denies any chest pain, shortness of breath, fevers/chills.  Describes abdominal pain as sharp epigastric and constant 6/10 pain, momentarily relieved when she takes a sip of water.  Says she has not been able to eat since last night, but is able to drink water.     Physical Exam   Triage vitals:  T 36.7 °C (98.1 °F)  HR (!) 118  BP (!) 158/100  RR 20  O2 98 % None (Room air)    General: Awake, alert, in no acute distress  Eyes: Gaze conjugate.  No scleral icterus or injection  HENT: Normo-cephalic, atraumatic. No stridor  CV: Tachycardic rate, regular rhythm. Radial pulses 2+ bilaterally  Resp: Breathing non-labored, speaking in full sentences.  Clear to auscultation bilaterally  GI: Soft, non-distended, localized epigastric pain. No rebound or guarding.  MSK/Extremities: No gross bony deformities. Moving all extremities  Skin: Warm. Appropriate color  Neuro: Alert. Oriented. Face symmetric. Speech is fluent.  Gross strength and sensation intact in b/l UE and LEs    Medical Decision Making & ED Course   Medical Decision Makin y.o. female presenting with one day history of abdominal pain, N/V/D.  On exam patient is stable, not in distress.  Comfortable at rest.  She is mildly tachycardic, no chest pain or shortness of breath. Clinically does not appear to be significantly dehydrated.  This appears  most consistent with an acute episode of gastritis.  However further workup including CBC, CMP, lipase will be performed to assess for other intra-abdominal pathology.    CBC, CMP, and lipase with no significant abnormalities.  On reexamination, patient symptoms have improved slightly after receiving zofran pepcid and BMX. Patient is taking p.o.   Engaged in shared decision making with patient and order CT abdomen pelvis given that pain has not subsided and she is still tachycardic.    EKG with significant QTc elevation (657) on arrival, repeat EKG with improved QTc at 443.    CT abdomen with no acute intra-abdominal abnormality.   Discussed lack of findings with patient and discharge with prescription for Pepcid and recommend following up with her primary care provider for further evaluation of her epigastric.  Also advised to keep taking her PPI along with the Pepcid if needed.        ----      Differential diagnoses considered include but are not limited to: Gastroenteritis, pancreatitis, GERD, peptic ulcer disease, gastritis     Social Determinants of Health which Significantly Impact Care: None identified     EKG Independent Interpretation: The EKG obtained at 7;04 am was independently interpreted by myself. It demonstrates sinus tachycardia rhythm with a ventricular rate of 122. Normal axis. Intervals with evidence of Qtc prolongation at 657. ST segments showed no ischemic changes.    Independent Result Review and Interpretation: Relevant laboratory and radiographic results were reviewed and independently interpreted by myself.  As necessary, they are commented on in the ED Course.    Chronic conditions affecting the patient's care: As documented above in [facility]    The patient was discussed with the following consultants/services: None    Care Considerations: As documented above in [facility]    ED Course:  ED Course as of 12/23/24 1128   Mon Dec 23, 2024   1006 EKG interpreted by attending physician.  Sinus.  Rate 122.   No acute ischemic changes.  No ST elevation.  QTc prolonged at 657. [CD]   1009 Repeat EKG shows an improved QTc at 443.  Sinus rhythm.  Rate 81.  No acute ischemic changes.  No ST elevation. [CD]      ED Course User Index  [CD] Woo Car MD         Diagnoses as of 12/23/24 1128   Epigastric pain     Disposition   As a result of the work-up, the patient was discharged home.  she was informed of her diagnosis and instructed to come back with any concerns or worsening of condition.  she and was agreeable to the plan as discussed above.  she was given the opportunity to ask questions.  All of the patient's questions were answered.    Procedures   Procedures    Patient seen and discussed with ED attending physician.    Shivam Sevilla MD  Emergency Medicine      Shivam Sevilla MD  Resident  12/23/24 8346

## 2024-12-24 PROCEDURE — 93005 ELECTROCARDIOGRAM TRACING: CPT

## 2024-12-27 LAB
ATRIAL RATE: 124 BPM
ATRIAL RATE: 82 BPM
P AXIS: 43 DEGREES
P AXIS: 46 DEGREES
PR INTERVAL: 103 MS
PR INTERVAL: 177 MS
Q ONSET: 252 MS
Q ONSET: 252 MS
QRS COUNT: 14 BEATS
QRS COUNT: 19 BEATS
QRS DURATION: 83 MS
QRS DURATION: 93 MS
QT INTERVAL: 381 MS
QT INTERVAL: 461 MS
QTC CALCULATION(BAZETT): 443 MS
QTC CALCULATION(BAZETT): 657 MS
QTC FREDERICIA: 421 MS
QTC FREDERICIA: 583 MS
R AXIS: 10 DEGREES
R AXIS: 11 DEGREES
T AXIS: -2 DEGREES
T AXIS: 39 DEGREES
T OFFSET: 443 MS
T OFFSET: 482 MS
VENTRICULAR RATE: 122 BPM
VENTRICULAR RATE: 81 BPM

## 2025-02-13 ENCOUNTER — OFFICE VISIT (OUTPATIENT)
Dept: ORTHOPEDIC SURGERY | Facility: CLINIC | Age: 50
End: 2025-02-13
Payer: COMMERCIAL

## 2025-02-13 DIAGNOSIS — G89.29 CHRONIC LEFT SHOULDER PAIN: Primary | ICD-10-CM

## 2025-02-13 DIAGNOSIS — M75.82 ROTATOR CUFF TENDINITIS, LEFT: ICD-10-CM

## 2025-02-13 DIAGNOSIS — M25.512 CHRONIC LEFT SHOULDER PAIN: Primary | ICD-10-CM

## 2025-02-13 PROCEDURE — 4010F ACE/ARB THERAPY RXD/TAKEN: CPT

## 2025-02-13 PROCEDURE — 20610 DRAIN/INJ JOINT/BURSA W/O US: CPT | Mod: LT

## 2025-02-13 PROCEDURE — 2500000004 HC RX 250 GENERAL PHARMACY W/ HCPCS (ALT 636 FOR OP/ED)

## 2025-02-13 PROCEDURE — 99203 OFFICE O/P NEW LOW 30 MIN: CPT

## 2025-02-13 PROCEDURE — 99213 OFFICE O/P EST LOW 20 MIN: CPT | Mod: 25

## 2025-02-13 RX ORDER — TRIAMCINOLONE ACETONIDE 40 MG/ML
40 INJECTION, SUSPENSION INTRA-ARTICULAR; INTRAMUSCULAR
Status: COMPLETED | OUTPATIENT
Start: 2025-02-13 | End: 2025-02-13

## 2025-02-13 RX ORDER — LIDOCAINE HYDROCHLORIDE 20 MG/ML
2 INJECTION, SOLUTION INFILTRATION; PERINEURAL
Status: COMPLETED | OUTPATIENT
Start: 2025-02-13 | End: 2025-02-13

## 2025-02-13 RX ADMIN — TRIAMCINOLONE ACETONIDE 40 MG: 400 INJECTION, SUSPENSION INTRA-ARTICULAR; INTRAMUSCULAR at 12:50

## 2025-02-13 RX ADMIN — LIDOCAINE HYDROCHLORIDE 2 ML: 20 INJECTION, SOLUTION INFILTRATION; PERINEURAL at 12:50

## 2025-02-13 NOTE — PROGRESS NOTES
Subjective    Patient ID: Mallory aCsillas is a 49 y.o. female.    Chief Complaint: Pain of the Left Shoulder (NPV L SHOULDER PAIN X 4-6 MONTHS NKI/SEEN AT [facility] 11/17/24 XRAYS NEG)    Left Shoulder       This is a pleasant 49-year-old right-hand-dominant female presenting to the office for evaluation of left shoulder pain, which has been ongoing for approximately 6 months, worsening over the last few weeks.  There is been no known injury.  Patient states she was seen at the [facility] on November 17, 2024, where multiple view x-rays of her left shoulder were obtained, without evidence of acute fracture or dislocation.  She was advised to take Motrin and follow-up with orthopedics as needed.  Presents to the office today with complaints of left shoulder pain, specifically when overhead reaching and reaching behind her back.  She has noticed slight decrease in limited range of motion and weakness.  She has not noticed any swelling.  She denies numbness and paresthesia of the left upper extremity.  She has been taking Tylenol and anti-inflammatories with temporary relief of symptoms.  She works, mostly sedentary work at a desk.  But she is also a .  She has an upcoming tournament in [place] in 2 weeks.    The patient's past medical, surgical, family, and social history as well as allergies and medications were reviewed and updated in the chart.    Objective   Ortho Exam  Pleasant and no acute distress. Left shoulder normal appearance, no overlying skin changes, no muscle atrophy.  Left shoulder forward flexion 160°. No effusion or instability. There is positive Neer and Montero impingement. There is subacromial crepitus and tenderness around the subacromial space. No biceps tenderness. No acromioclavicular tenderness. Rotator cuff strength is intact but there is discomfort with strength testing. Right shoulder forward flexion 180°. No effusion or instability. No impingement or crepitus or  tenderness involving the subacromial space. No biceps or acromioclavicular tenderness. There is intact rotator cuff strength. Both upper extremities are well perfused, skin is intact and muscle tone is adequate. Elbow flexion and extension and wrist flexion and extension strength are intact. Sensation intact to light touch.    Image Results:  X-ray report of the left shoulder obtained on November 17, 2024 from the [facility] personally reviewed, without evidence of acute fracture or dislocation.  There is no acute osseous bony abnormality noted.    Assessment/Plan   Encounter Diagnoses:  Chronic left shoulder pain    Rotator cuff tendinitis, left    Plan: Discussion with patient in regards to left shoulder rotator cuff tendinitis with review of x-ray report from the [facility].  Conservative treatment options were discussed at length.  She will benefit from a formal physical therapy program to help with left shoulder strengthening and range of motion, referral provided.  After the risks and benefits of a left shoulder subacromial bursa injection of Kenalog/lidocaine was discussed, patient agreed to injection and tolerated well.  She can continue to take Motrin and Tylenol as well as apply ice.  She should avoid aggravating activities.  Explained to patient if she does not see significant relief of symptoms following conservative treatment options of physical therapy and injection, an MRI of the right shoulder could be indicated to assess for the integrity of the rotator cuff, assess for rotator cuff tear.  She will follow-up as symptoms dictate.    L Inj/Asp: L subacromial bursa on 2/13/2025 12:50 PM  Indications: pain  Details: 22 G needle, posterior approach  Medications: 40 mg triamcinolone acetonide 40 mg/mL; 2 mL lidocaine 20 mg/mL (2 %)  Procedure, treatment alternatives, risks and benefits explained, specific risks discussed. Consent was given by the patient.          Orders Placed This Encounter     Referral to Physical Therapy

## 2025-03-10 ENCOUNTER — HOSPITAL ENCOUNTER (OUTPATIENT)
Dept: RADIOLOGY | Facility: CLINIC | Age: 50
Discharge: HOME | End: 2025-03-10
Payer: COMMERCIAL

## 2025-03-10 ENCOUNTER — OFFICE VISIT (OUTPATIENT)
Dept: ORTHOPEDIC SURGERY | Facility: CLINIC | Age: 50
End: 2025-03-10
Payer: COMMERCIAL

## 2025-03-10 VITALS — BODY MASS INDEX: 32.44 KG/M2 | HEIGHT: 64 IN | WEIGHT: 190 LBS

## 2025-03-10 DIAGNOSIS — M25.511 RIGHT SHOULDER PAIN, UNSPECIFIED CHRONICITY: ICD-10-CM

## 2025-03-10 DIAGNOSIS — M75.82 TENDONITIS OF BOTH ROTATOR CUFFS: ICD-10-CM

## 2025-03-10 DIAGNOSIS — M25.512 LEFT SHOULDER PAIN, UNSPECIFIED CHRONICITY: ICD-10-CM

## 2025-03-10 DIAGNOSIS — M75.81 TENDONITIS OF BOTH ROTATOR CUFFS: ICD-10-CM

## 2025-03-10 PROCEDURE — 2500000004 HC RX 250 GENERAL PHARMACY W/ HCPCS (ALT 636 FOR OP/ED)

## 2025-03-10 PROCEDURE — 20610 DRAIN/INJ JOINT/BURSA W/O US: CPT | Mod: RT

## 2025-03-10 PROCEDURE — 99213 OFFICE O/P EST LOW 20 MIN: CPT | Mod: 25

## 2025-03-10 PROCEDURE — 73030 X-RAY EXAM OF SHOULDER: CPT | Mod: 50

## 2025-03-10 PROCEDURE — 4010F ACE/ARB THERAPY RXD/TAKEN: CPT

## 2025-03-10 PROCEDURE — 3008F BODY MASS INDEX DOCD: CPT

## 2025-03-10 PROCEDURE — 99213 OFFICE O/P EST LOW 20 MIN: CPT

## 2025-03-10 RX ADMIN — LIDOCAINE HYDROCHLORIDE 2 ML: 20 INJECTION, SOLUTION INFILTRATION; PERINEURAL at 14:00

## 2025-03-10 RX ADMIN — TRIAMCINOLONE ACETONIDE 40 MG: 40 INJECTION, SUSPENSION INTRA-ARTICULAR; INTRAMUSCULAR at 14:00

## 2025-03-11 RX ORDER — LIDOCAINE HYDROCHLORIDE 20 MG/ML
2 INJECTION, SOLUTION INFILTRATION; PERINEURAL
Status: COMPLETED | OUTPATIENT
Start: 2025-03-10 | End: 2025-03-10

## 2025-03-11 RX ORDER — TRIAMCINOLONE ACETONIDE 40 MG/ML
40 INJECTION, SUSPENSION INTRA-ARTICULAR; INTRAMUSCULAR
Status: COMPLETED | OUTPATIENT
Start: 2025-03-10 | End: 2025-03-10

## 2025-03-11 NOTE — PROGRESS NOTES
Subjective    Patient ID: Mallory Casillas is a 49 y.o. female.    Chief Complaint: Pain of the Right Shoulder and Pain of the Left Shoulder     Right Shoulder     Left Shoulder       This is a pleasant 49-year-old right-hand-dominant female presenting to the office for evaluation of right shoulder pain, which has been ongoing for approximately 3 months.  There is been no known injury.  I last saw patient in regards to left shoulder pain approximately 1 month ago.  She received a left shoulder subacromial bursa injection of Kenalog/lidocaine and states that left shoulder pain has improved.  She has now noticed increased pain in the right shoulder.  She points to her right shoulder and deltoid when describing the pain.  Pain is described as a constant dull ache, sharp shooting at times.  She has noticed slight decrease in range of motion and some degree of weakness.  She denies numbness and paresthesia of right upper extremity.  She has been taking Tylenol and anti-inflammatories with temporary relief of symptoms.  She works, sedentary work at a desk.  She is also a .  She likes to play weekly and does do tournaments on the weekend.    The patient's past medical, surgical, family, and social history as well as allergies and medications were reviewed and updated in the chart.    Objective   Ortho Exam  Pleasant and no acute distress. Right shoulder normal appearance, no overlying skin changes, no muscle atrophy.  Right shoulder forward flexion 160°. No effusion or instability. There is positive Neer and Montero impingement. There is subacromial crepitus and tenderness around the subacromial space. No biceps tenderness. No acromioclavicular tenderness. Rotator cuff strength is decreased and there is discomfort with strength testing. Left shoulder forward flexion 180°. No effusion or instability. No impingement or crepitus or tenderness involving the subacromial space. No biceps or acromioclavicular  tenderness. There is intact rotator cuff strength. There is adequate range of motion of the cervical spine without pain. Both upper extremities are well perfused, skin is intact and muscle tone is adequate. Elbow flexion and extension and wrist flexion and extension strength are intact. Sensation intact to light touch.    Image Results:  Multiple view x-rays of the right shoulder obtained today personally reviewed, without evidence of acute fracture or dislocation.  The glenohumeral and acromioclavicular joint spaces are well-maintained.    Assessment/Plan   Encounter Diagnoses:  Right shoulder pain, unspecified chronicity    Left shoulder pain, unspecified chronicity    Tendonitis of both rotator cuffs    Plan: Discussion with patient in regards to right shoulder rotator cuff tendinitis with review of today's x-rays.  Conservative treatment options were discussed at length.  After the risk and benefits of right shoulder subacromial bursa injection of Kenalog/lidocaine was discussed, patient agreed to injection and tolerated well.  She is aware that she can receive these injections every 3 months as needed.  I do think that patient could benefit from formal physical therapy in regards to both shoulders, referral to physical therapy was provided.  She can continue with Aleve and Tylenol.  She also applies IcyHot and ice as needed.  If patient does not see significant relief of symptoms following formal physical therapy and injections today, an MRI of the right or left shoulder would be indicated to assess for the integrity of the rotator cuff.  She will avoid aggravating activities and can follow-up as symptoms dictate in regards to both shoulders.    L Inj/Asp: R subacromial bursa on 3/10/2025 2:00 PM  Indications: pain  Details: 22 G needle, posterior approach  Medications: 40 mg triamcinolone acetonide 40 mg/mL; 2 mL lidocaine 20 mg/mL (2 %)  Procedure, treatment alternatives, risks and benefits explained,  specific risks discussed. Consent was given by the patient.            Orders Placed This Encounter    XR shoulder left 2+ views    Referral to Physical Therapy

## 2025-03-20 ENCOUNTER — DOCUMENTATION (OUTPATIENT)
Dept: PHYSICAL THERAPY | Facility: CLINIC | Age: 50
End: 2025-03-20
Payer: COMMERCIAL

## 2025-03-20 ENCOUNTER — APPOINTMENT (OUTPATIENT)
Dept: PHYSICAL THERAPY | Facility: CLINIC | Age: 50
End: 2025-03-20
Payer: COMMERCIAL

## 2025-03-20 NOTE — PROGRESS NOTES
Physical Therapy                 Therapy Communication Note    Patient Name: Mallory Casillas  MRN: [id number]  Department:   Room: Room/bed info not found  Today's Date: 3/20/2025     Discipline: Physical Therapy      Missed Time: Cancel    Comment: Pt canceled initial evaluation via [employer].

## 2025-04-09 ENCOUNTER — APPOINTMENT (OUTPATIENT)
Dept: PRIMARY CARE | Facility: CLINIC | Age: 50
End: 2025-04-09
Payer: COMMERCIAL

## 2025-04-09 VITALS — WEIGHT: 189 LBS | DIASTOLIC BLOOD PRESSURE: 94 MMHG | SYSTOLIC BLOOD PRESSURE: 112 MMHG | BODY MASS INDEX: 32.44 KG/M2

## 2025-04-09 DIAGNOSIS — Z86.69 HX OF MIGRAINE HEADACHES: ICD-10-CM

## 2025-04-09 DIAGNOSIS — E66.811 OBESITY, CLASS I, BMI 30-34.9: ICD-10-CM

## 2025-04-09 DIAGNOSIS — K21.9 GASTROESOPHAGEAL REFLUX DISEASE, UNSPECIFIED WHETHER ESOPHAGITIS PRESENT: ICD-10-CM

## 2025-04-09 DIAGNOSIS — Z12.11 COLON CANCER SCREENING: ICD-10-CM

## 2025-04-09 DIAGNOSIS — L70.0 ACNE VULGARIS: ICD-10-CM

## 2025-04-09 DIAGNOSIS — E11.65 TYPE 2 DIABETES MELLITUS WITH HYPERGLYCEMIA, WITHOUT LONG-TERM CURRENT USE OF INSULIN: ICD-10-CM

## 2025-04-09 DIAGNOSIS — F17.200 CURRENT SMOKER: ICD-10-CM

## 2025-04-09 DIAGNOSIS — I10 PRIMARY HYPERTENSION: ICD-10-CM

## 2025-04-09 DIAGNOSIS — R10.13 EPIGASTRIC ABDOMINAL PAIN: Primary | ICD-10-CM

## 2025-04-09 PROCEDURE — 4010F ACE/ARB THERAPY RXD/TAKEN: CPT | Performed by: STUDENT IN AN ORGANIZED HEALTH CARE EDUCATION/TRAINING PROGRAM

## 2025-04-09 PROCEDURE — 3080F DIAST BP >= 90 MM HG: CPT | Performed by: STUDENT IN AN ORGANIZED HEALTH CARE EDUCATION/TRAINING PROGRAM

## 2025-04-09 PROCEDURE — 3074F SYST BP LT 130 MM HG: CPT | Performed by: STUDENT IN AN ORGANIZED HEALTH CARE EDUCATION/TRAINING PROGRAM

## 2025-04-09 PROCEDURE — 99205 OFFICE O/P NEW HI 60 MIN: CPT | Performed by: STUDENT IN AN ORGANIZED HEALTH CARE EDUCATION/TRAINING PROGRAM

## 2025-04-09 RX ORDER — GALCANEZUMAB 120 MG/ML
120 INJECTION, SOLUTION SUBCUTANEOUS
COMMUNITY
Start: 2025-03-21 | End: 2025-09-17

## 2025-04-09 RX ORDER — VONOPRAZAN FUMARATE 13.36 MG/1
10 TABLET ORAL DAILY
Qty: 30 TABLET | Refills: 5 | Status: SHIPPED | OUTPATIENT
Start: 2025-04-09

## 2025-04-09 RX ORDER — PANTOPRAZOLE SODIUM 40 MG/1
TABLET, DELAYED RELEASE ORAL
COMMUNITY

## 2025-04-09 RX ORDER — SPIRONOLACTONE 100 MG/1
100 TABLET, FILM COATED ORAL
COMMUNITY
Start: 2025-01-23

## 2025-04-09 RX ORDER — ESTRADIOL 0.1 MG/D
1 FILM, EXTENDED RELEASE TRANSDERMAL 2 TIMES WEEKLY
COMMUNITY
Start: 2025-04-07

## 2025-04-09 RX ORDER — VALSARTAN 80 MG/1
80 TABLET ORAL DAILY
Qty: 90 TABLET | Refills: 3 | Status: SHIPPED | OUTPATIENT
Start: 2025-04-09 | End: 2026-04-09

## 2025-04-09 RX ORDER — DOXYCYCLINE HYCLATE 20 MG
20 TABLET ORAL 2 TIMES DAILY
COMMUNITY

## 2025-04-09 RX ORDER — ELETRIPTAN HYDROBROMIDE 40 MG/1
TABLET, FILM COATED ORAL
COMMUNITY

## 2025-04-09 RX ORDER — TIRZEPATIDE 2.5 MG/.5ML
2.5 INJECTION, SOLUTION SUBCUTANEOUS WEEKLY
Qty: 2 ML | Refills: 2 | Status: SHIPPED | OUTPATIENT
Start: 2025-04-09

## 2025-04-09 NOTE — PROGRESS NOTES
Subjective   Patient ID: Mallory Casillas is a 49 y.o. female who presents for Establish Care.  HPI  Mallory is here to establish care.    BP remains uncontrolled. Taking valsartan daily. 150/100 multiple times per week on home readings. Taking propranolol for migraine headaches. Taking spironolactone for acne as well. Established with dermatology. Establishing with new neurologist through .    She started having stabbing epigastric pain, worse with food. Improves when she is not eating. Tends to be constipated , ~3 bowel movements weekly. No hematochezia or melena. No fevers or weight changes recently. Hx of EGD ~2-3 years ago with [facility], EGD demonstrated some gastritis. She was previously taking a lot of NSAIDs for headaches, no longer using nsaids.    Has tried quitting smoking with chantix, nicotine patches.     Sleep is okay at times. Drinks caffeine in the morning. She is eating a balanced diet. Eating most meals at home. Drinking some water ~2 glasses. Trying to avoid salt in the diet.     PMHx: HTN, migraine headaches  SurgHx: cholecystectomy, hysterectomy, knee surgeries   FamHx: HTN, DM - mother  SocialHx: Current smoker (~1/2 ppd for 30 years), Never vaped. Rare social EtOH use. No drug use. Working at law firm. Lives at home with  and three children (27, 22, 19).     Review of Systems  12-point ROS was reviewed and is negative, unless otherwise noted in HPI    Objective   Vitals:    04/09/25 1303   BP: (!) 112/94      Physical Exam  GEN: alert, conversant, NAD  HEENT: PERRL, EOMI, MMM, Tms pearly gray bilaterally  NECK: supple, no LAD appreciated  CHEST: CTAB  CV: S1, S2, RRR, no murmurs appreciated  ABD: soft, NT, ND  EXT: no significant LE edema  SKIN: warm, dry    Assessment/Plan   # Diabetes Mellitus type 2  #obesity, class I  Most Recent HgbA1c: 6.4%  Had been following with weight management at [facility]  Previously on Metformin, Ozempic  - trial low dose mounjaro 2.5mg weekly  -  discussed SE profile. Discussed need for prophylactic bowel regimen.  Lipid panel, microalbumin checked less than a year ago.  Increase dietary efforts and physical activity.  Routine diabetic retinopathy screening, foot exam/monofilament testing screening: up-to-date.    # HTN  uncontrolled  Increase valsartan to 80mg daily  Discussed DASH diet and dietary sodium restrictions.   Increase dietary efforts and physical activity.     #refractory GERD  EGD ~2-3 years ago at [facility] with some gastritis per patietn report  - maintained on protonix 40mg daily without relief. Previosuly on omeprazole, esomeprazole, pepcid without relief.  - start voquenza 10mg daily  - referral to GI     #migraine headaches  - maintained on emgality monthly, propranolol daily  - establishing with  neurology in 9/2025    #acne  - maintained on spironolactone daily  - following with dermatology    #Current smoker  - 1/2 ppd smoker. 30 pack year hx  - Advised cessation, counseled cessation tips, offer cessation aids  - Spent >5 minutes counseling smoking cessation    Health Maintenance:  Vaccines: COVID (x3), Flu (sometimes), TDAP (2021), pneumonia (x1)  Screening: Colonoscopy (ordered), Mammogram (12/2024), Paptest (s/p hysterectomy)   Labs: Reviewed recent, repeat at next visit    I spent >60 minutes reviewing chart, visiting with patient, writing prescriptions and documenting in the chart.       RTC in 3 months, or sooner PRN    Aman Mims, DO

## 2025-04-10 ENCOUNTER — OFFICE VISIT (OUTPATIENT)
Dept: GASTROENTEROLOGY | Facility: CLINIC | Age: 50
End: 2025-04-10
Payer: COMMERCIAL

## 2025-04-10 VITALS
RESPIRATION RATE: 18 BRPM | BODY MASS INDEX: 32.33 KG/M2 | DIASTOLIC BLOOD PRESSURE: 80 MMHG | HEART RATE: 73 BPM | SYSTOLIC BLOOD PRESSURE: 120 MMHG | HEIGHT: 64 IN | OXYGEN SATURATION: 93 % | WEIGHT: 189.4 LBS

## 2025-04-10 DIAGNOSIS — R10.13 EPIGASTRIC ABDOMINAL PAIN: ICD-10-CM

## 2025-04-10 PROCEDURE — 3074F SYST BP LT 130 MM HG: CPT | Performed by: INTERNAL MEDICINE

## 2025-04-10 PROCEDURE — 99204 OFFICE O/P NEW MOD 45 MIN: CPT | Performed by: INTERNAL MEDICINE

## 2025-04-10 PROCEDURE — 3008F BODY MASS INDEX DOCD: CPT | Performed by: INTERNAL MEDICINE

## 2025-04-10 PROCEDURE — 3079F DIAST BP 80-89 MM HG: CPT | Performed by: INTERNAL MEDICINE

## 2025-04-10 PROCEDURE — 4010F ACE/ARB THERAPY RXD/TAKEN: CPT | Performed by: INTERNAL MEDICINE

## 2025-04-10 ASSESSMENT — ENCOUNTER SYMPTOMS
ABDOMINAL PAIN: 1
CONSTITUTIONAL NEGATIVE: 1
RESPIRATORY NEGATIVE: 1
NEUROLOGICAL NEGATIVE: 1
ENDOCRINE NEGATIVE: 1
CARDIOVASCULAR NEGATIVE: 1
NAUSEA: 1

## 2025-04-10 NOTE — PATIENT INSTRUCTIONS
Epigastric abdominal pain  -     Referral to Gastroenterology  -     Esophagogastroduodenoscopy (EGD); Future  Continue Pantoprazole 40mg oral daily.   Follow up based on the results.   Call with questions.

## 2025-04-10 NOTE — PROGRESS NOTES
Subjective   Patient ID: Mallory Casillas is a 49 y.o. female who presents for Abdominal Pain (Reports having epigastric pain that radiates to her back for the last week or two. Pt also has acid reflux with some nausea at times. No hx of EGD, denies family hx).  HPI  Patient is a 49-year-old woman with past medical history significant for migraine headache, hypertension.  Longstanding acid reflux disease for which she was on pantoprazole.    She started having stabbing epigastric pain, worse with food. Improves when she is not eating. Tends to be constipated , ~3 bowel movements weekly. No hematochezia or melena. No fevers or weight changes recently. Hx of EGD ~2-3 years ago with [facility], EGD demonstrated some gastritis. She was previously taking a lot of NSAIDs for headaches, no longer using nsaids.     CT scan abdominal pelvis December 2024 showed small liver cyst otherwise was essentially normal.  No previous colonoscopy.  She had a upper endoscopy at [facility] in 2020 I do not have results for review.  She is status postcholecystectomy. 10 years.   She is currently on Voquenza 10 mg oral once daily.Still waiting PA.  Taking Pnatoprazole in addition sh will take OTC pepcid,     Epigastric pain after she eats can stay the whole day. Radiate to the back. Going on for few months but now worse recently.  No previous colonoscopy currently has an upcoming one in May in [facility].  Denies family history of colon cancer.  Denies any weight loss.      Current Outpatient Medications on File Prior to Visit   Medication Sig Dispense Refill    doxycycline (Periostat) 20 mg tablet Take 1 tablet (20 mg) by mouth 2 times a day.      eletriptan (Relpax) 40 mg tablet TAKE 1 TABLET BY MOUTH AS NEEDED AT ONSET OF MIGRAINE. MAY REPEAT IN 2 HRS IF NEEDED. MAXIMUM USE 2 DAYS PER WK.      Emgality Pen 120 mg/mL auto-injector Inject 1 Syringe (120 mg) under the skin every 30 (thirty) days.      estradiol (Vivelle-DOT)  0.1 mg/24 hr patch Place 1 patch on the skin 2 times a week.      pantoprazole (ProtoNix) 40 mg EC tablet take 1 tablet BY MOUTH 30 TO 60 minutes before breakfast and dinner      propranolol XL (Innopran XL) 120 mg 24 hr capsule Take 1 capsule (120 mg) by mouth once daily. Do not crush, chew, or split.      spironolactone (Aldactone) 100 mg tablet Take 1 tablet (100 mg) by mouth once daily.      tirzepatide (Mounjaro) 2.5 mg/0.5 mL pen injector Inject 2.5 mg under the skin 1 (one) time per week. 2 mL 2    valsartan (Diovan) 80 mg tablet Take 1 tablet (80 mg) by mouth once daily. 90 tablet 3    vonoprazan (Voquezna) 10 mg tablet Take 10 mg by mouth once daily. 30 tablet 5    [DISCONTINUED] famotidine (Pepcid) 20 mg tablet Take 1 tablet (20 mg) by mouth 2 times a day for 15 days. 30 tablet 0    [DISCONTINUED] metoclopramide (Reglan) 10 mg tablet Take 1 tablet (10 mg) by mouth 3 times a day as needed (headache) for up to 7 days. 15 tablet 0    [DISCONTINUED] phentermine (Lomaira) 8 mg tablet Take 1 tablet (8 mg) by mouth once daily. (Patient not taking: Reported on 4/9/2025)      [DISCONTINUED] valsartan (Diovan) 40 mg tablet Take 1 tablet (40 mg) by mouth once daily.       No current facility-administered medications on file prior to visit.        Review of Systems   Constitutional: Negative.    Respiratory: Negative.     Cardiovascular: Negative.    Gastrointestinal:  Positive for abdominal pain and nausea.   Endocrine: Negative.    Genitourinary: Negative.    Skin: Negative.    Neurological: Negative.        Objective   Physical Exam  Constitutional:       Appearance: Normal appearance.   HENT:      Head: Normocephalic and atraumatic.   Cardiovascular:      Rate and Rhythm: Normal rate and regular rhythm.      Pulses: Normal pulses.      Heart sounds: Normal heart sounds.   Pulmonary:      Effort: Pulmonary effort is normal.      Breath sounds: Normal breath sounds.   Abdominal:      General: Abdomen is flat. Bowel  "sounds are normal.      Palpations: Abdomen is soft.      Tenderness: There is no abdominal tenderness.   Musculoskeletal:         General: Normal range of motion.      Cervical back: Normal range of motion.   Skin:     General: Skin is warm.   Neurological:      Mental Status: She is alert.     /80 (BP Location: Left arm, Patient Position: Sitting, BP Cuff Size: Adult)   Pulse 73   Resp 18   Ht 1.626 m (5' 4\")   Wt 85.9 kg (189 lb 6.4 oz)   SpO2 93%   BMI 32.51 kg/m²      Lab Results   Component Value Date    WBC 10.4 12/23/2024    HGB 13.8 12/23/2024    HCT 41.6 12/23/2024    MCV 87 12/23/2024     12/23/2024           No lab exists for component: \"LABALBU\"    No results found for: \"AFP\"  Lab Results   Component Value Date    TSH 1.42 05/15/2022     CT abdomen pelvis wo IV contrast  Status: Final result     PACS Images     Show images for CT abdomen pelvis wo IV contrast  Signed by    Signed Time Phone Pager   Florencio Baig MD 12/23/2024 10:35 [phone]      Exam Information    Status Exam Begun Exam Ended   Final 12/23/2024 09:49 12/23/2024 09:49     Study Result    Narrative & Impression   Interpreted By:  Florencio Baig,   STUDY:  CT ABDOMEN PELVIS WO IV CONTRAST; ;  12/23/2024 9:49 am      INDICATION:  Signs/Symptoms:abdominal pain.          COMPARISON:  12/13/2022      ACCESSION NUMBER(S):  [id number]      ORDERING CLINICIAN:  ABDULAZIZ STRATTON      TECHNIQUE:  Serial axial unenhanced CT images obtained of the abdomen and pelvis.  Images reformatted in the coronal and sagittal projection      FINDINGS:  Included lung bases are unremarkable. Distal esophagus is  unremarkable.      Liver demonstrates a hypodensity in the right lobe of the liver  segment 8 near the dome of the diaphragm identified measuring 1.7 cm  with average Hounsfield unit of 8 in keeping with hepatic cyst as  seen on prior imaging. Otherwise, liver is unremarkable within limits  of this unenhanced CT      Status " post cholecystectomy.      Spleen and adrenal glands are unremarkable.      Pancreas is unremarkable within limits of this unenhanced CT.      Right kidney demonstrates no hydronephrosis or nephrolithiasis.  Visualized course of the right ureter is unremarkable to the level of  the bladder.      Left kidney demonstrates no hydronephrosis or nephrolithiasis.  Visualized course of the left ureter is unremarkable. 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      Retroperitoneum demonstrates no lymphadenopathy. There is no ascites      Loops of large bowel are unremarkable. Appendix is seen and is  unremarkable. Small bowel loops are nondilated. There is no  lymphadenopathy in the mesentery. Stomach is distended with fluid  contents. There is a small umbilical hernia containing fat measuring  1.5 cm.      CT pelvis:      Unopacified bladder is unremarkable. There is no pelvic  lymphadenopathy. Status post hysterectomy. Visualized adnexa are  unremarkable.      Visualized osseous structures demonstrate no compression deformity in  the spine. Mild multilevel endplate degenerative changes lower  thoracic spine with anterior osteophyte formation. No narrowing of  the thecal sac.                      IMPRESSION:  1. No acute intra-abdominal abnormality. No dilated loops of bowel.      2. No obstructive uropathy. No nephrolithiasis.      3. Small umbilical hernia containing fat.       Assessment/Plan   Diagnoses and all orders for this visit:  Epigastric abdominal pain  -     Referral to Gastroenterology  -     Esophagogastroduodenoscopy (EGD); Future  Cross-sectional imaging has been negative, patient reports history of gastritis,.  No improvement in symptoms with PPI therapy.  Symptoms have been going on for 6 months however over the last few weeks have been quite severe.  We will proceed with endoscopy evaluation at this time.  If endoscopy is normal then this is likely functional in nature.  May need  further testing which may include gastric emptying study and breath test.    Continue Pantoprazole 40mg oral daily.   Follow up based on the results.   Call with questions.

## 2025-04-15 DIAGNOSIS — E11.65 TYPE 2 DIABETES MELLITUS WITH HYPERGLYCEMIA, WITHOUT LONG-TERM CURRENT USE OF INSULIN: Primary | ICD-10-CM

## 2025-04-15 DIAGNOSIS — R10.13 EPIGASTRIC ABDOMINAL PAIN: ICD-10-CM

## 2025-04-15 DIAGNOSIS — K21.9 GASTROESOPHAGEAL REFLUX DISEASE, UNSPECIFIED WHETHER ESOPHAGITIS PRESENT: ICD-10-CM

## 2025-04-15 RX ORDER — DEXLANSOPRAZOLE 30 MG/1
30 CAPSULE, DELAYED RELEASE ORAL DAILY
Qty: 30 CAPSULE | Refills: 5 | Status: SHIPPED | OUTPATIENT
Start: 2025-04-15 | End: 2025-10-12

## 2025-04-22 DIAGNOSIS — Z86.69 HX OF MIGRAINE HEADACHES: Primary | ICD-10-CM

## 2025-04-22 DIAGNOSIS — Z12.11 COLON CANCER SCREENING: ICD-10-CM

## 2025-04-22 RX ORDER — GALCANEZUMAB 120 MG/ML
120 INJECTION, SOLUTION SUBCUTANEOUS
Qty: 1 EACH | Refills: 5 | Status: SHIPPED | OUTPATIENT
Start: 2025-04-22 | End: 2025-10-19

## 2025-04-23 RX ORDER — POLYETHYLENE GLYCOL 3350, SODIUM SULFATE, POTASSIUM CHLORIDE, MAGNESIUM SULFATE, AND SODIUM CHLORIDE FOR ORAL SOLUTION 178.7-7.3G
1 KIT ORAL SEE ADMIN INSTRUCTIONS
Qty: 1 EACH | Refills: 0 | Status: SHIPPED | OUTPATIENT
Start: 2025-04-23

## 2025-05-06 ENCOUNTER — ANESTHESIA EVENT (OUTPATIENT)
Dept: GASTROENTEROLOGY | Facility: EXTERNAL LOCATION | Age: 50
End: 2025-05-06

## 2025-05-06 DIAGNOSIS — Z12.11 COLON CANCER SCREENING: Primary | ICD-10-CM

## 2025-05-14 ENCOUNTER — APPOINTMENT (OUTPATIENT)
Dept: GASTROENTEROLOGY | Facility: EXTERNAL LOCATION | Age: 50
End: 2025-05-14
Payer: COMMERCIAL

## 2025-05-14 ENCOUNTER — ANESTHESIA (OUTPATIENT)
Dept: GASTROENTEROLOGY | Facility: EXTERNAL LOCATION | Age: 50
End: 2025-05-14

## 2025-05-14 VITALS
HEART RATE: 71 BPM | OXYGEN SATURATION: 98 % | RESPIRATION RATE: 16 BRPM | SYSTOLIC BLOOD PRESSURE: 118 MMHG | DIASTOLIC BLOOD PRESSURE: 76 MMHG | TEMPERATURE: 96.8 F

## 2025-05-14 DIAGNOSIS — Z12.11 COLON CANCER SCREENING: Primary | ICD-10-CM

## 2025-05-14 DIAGNOSIS — R10.13 EPIGASTRIC ABDOMINAL PAIN: ICD-10-CM

## 2025-05-14 PROCEDURE — 43239 EGD BIOPSY SINGLE/MULTIPLE: CPT | Performed by: INTERNAL MEDICINE

## 2025-05-14 PROCEDURE — 45380 COLONOSCOPY AND BIOPSY: CPT | Performed by: INTERNAL MEDICINE

## 2025-05-14 PROCEDURE — 45385 COLONOSCOPY W/LESION REMOVAL: CPT | Performed by: INTERNAL MEDICINE

## 2025-05-14 RX ORDER — PROPOFOL 10 MG/ML
INJECTION, EMULSION INTRAVENOUS AS NEEDED
Status: DISCONTINUED | OUTPATIENT
Start: 2025-05-14 | End: 2025-05-14

## 2025-05-14 RX ORDER — LIDOCAINE HYDROCHLORIDE 20 MG/ML
INJECTION, SOLUTION INFILTRATION; PERINEURAL AS NEEDED
Status: DISCONTINUED | OUTPATIENT
Start: 2025-05-14 | End: 2025-05-14

## 2025-05-14 RX ORDER — SODIUM CHLORIDE 9 MG/ML
INJECTION, SOLUTION INTRAVENOUS CONTINUOUS PRN
Status: DISCONTINUED | OUTPATIENT
Start: 2025-05-14 | End: 2025-05-14

## 2025-05-14 RX ADMIN — PROPOFOL 150 MG: 10 INJECTION, EMULSION INTRAVENOUS at 08:50

## 2025-05-14 RX ADMIN — LIDOCAINE HYDROCHLORIDE 100 MG: 20 INJECTION, SOLUTION INFILTRATION; PERINEURAL at 08:50

## 2025-05-14 RX ADMIN — PROPOFOL 50 MG: 10 INJECTION, EMULSION INTRAVENOUS at 08:55

## 2025-05-14 RX ADMIN — PROPOFOL 50 MG: 10 INJECTION, EMULSION INTRAVENOUS at 08:59

## 2025-05-14 RX ADMIN — PROPOFOL 50 MG: 10 INJECTION, EMULSION INTRAVENOUS at 09:03

## 2025-05-14 RX ADMIN — SODIUM CHLORIDE: 9 INJECTION, SOLUTION INTRAVENOUS at 08:49

## 2025-05-14 SDOH — HEALTH STABILITY: MENTAL HEALTH: CURRENT SMOKER: 0

## 2025-05-14 ASSESSMENT — COLUMBIA-SUICIDE SEVERITY RATING SCALE - C-SSRS
2. HAVE YOU ACTUALLY HAD ANY THOUGHTS OF KILLING YOURSELF?: NO
1. IN THE PAST MONTH, HAVE YOU WISHED YOU WERE DEAD OR WISHED YOU COULD GO TO SLEEP AND NOT WAKE UP?: NO
6. HAVE YOU EVER DONE ANYTHING, STARTED TO DO ANYTHING, OR PREPARED TO DO ANYTHING TO END YOUR LIFE?: NO

## 2025-05-14 ASSESSMENT — ENCOUNTER SYMPTOMS
RESPIRATORY NEGATIVE: 1
CARDIOVASCULAR NEGATIVE: 1
NEUROLOGICAL NEGATIVE: 1
CONSTITUTIONAL NEGATIVE: 1
ENDOCRINE NEGATIVE: 1

## 2025-05-14 ASSESSMENT — PAIN - FUNCTIONAL ASSESSMENT
PAIN_FUNCTIONAL_ASSESSMENT: 0-10

## 2025-05-14 ASSESSMENT — PAIN SCALES - GENERAL
PAINLEVEL_OUTOF10: 0 - NO PAIN
PAIN_LEVEL: 0
PAINLEVEL_OUTOF10: 0 - NO PAIN
PAINLEVEL_OUTOF10: 0 - NO PAIN

## 2025-05-14 NOTE — ANESTHESIA POSTPROCEDURE EVALUATION
Patient: Mallory MATUTE Winter    Procedure Summary       Date: 05/14/25 Room / Location: [facility]    Anesthesia Start: 0849 Anesthesia Stop:     Procedures:       COLONOSCOPY      EGD Diagnosis:       Colon cancer screening      Epigastric abdominal pain      Colon cancer screening    Scheduled Providers: Markus Schilling MD Responsible Provider: KARON Mar    Anesthesia Type: MAC ASA Status: 2            Anesthesia Type: MAC    Vitals Value Taken Time   /69 05/14/25 09:13   Temp 36 °C (96.8 °F) 05/14/25 09:13   Pulse 79 05/14/25 09:13   Resp 17 05/14/25 09:13   SpO2 99 % 05/14/25 09:13       Anesthesia Post Evaluation    Patient location during evaluation: bedside  Patient participation: complete - patient cannot participate  Level of consciousness: awake and responsive to verbal stimuli  Pain score: 0  Pain management: adequate  Airway patency: patent  Cardiovascular status: acceptable and hemodynamically stable  Respiratory status: acceptable  Hydration status: acceptable  Postoperative Nausea and Vomiting: none        No notable events documented.

## 2025-05-14 NOTE — ANESTHESIA PREPROCEDURE EVALUATION
Patient: Mallory MATUTE Winter    Procedure Information       Date/Time: 05/14/25 0900    Scheduled providers: Markus Schilling MD    Procedures:       COLONOSCOPY      EGD    Location: [facility]            Relevant Problems   Cardiac   (+) Chest pain   (+) Hypertension   (+) Mixed hyperlipidemia      Neuro   (+) Anxiety      GI   (+) Gastroesophageal reflux disease      Endocrine   (+) Controlled type 2 diabetes mellitus without complication, without long-term current use of insulin       Clinical information reviewed:   Tobacco  Allergies    Med Hx  Surg Hx   Fam Hx  Soc Hx        NPO Detail:  NPO/Void Status  Carbohydrate Drink Given Prior to Surgery? : N  Date of Last Liquid: 05/13/25  Time of Last Liquid: 2300  Date of Last Solid: 05/12/25  Time of Last Solid: 2100  Last Intake Type: Clear fluids  Time of Last Void: 0805         Physical Exam    Airway  Mallampati: III  TM distance: >3 FB  Neck ROM: full     Cardiovascular - normal exam   Dental - normal exam     Pulmonary - normal examBreath sounds clear to auscultation     Abdominal            Anesthesia Plan    History of general anesthesia?: yes  History of complications of general anesthesia?: no    ASA 2     MAC     The patient is not a current smoker.    intravenous induction   Anesthetic plan and risks discussed with patient.    Plan discussed with CRNA.

## 2025-05-14 NOTE — H&P
History Of Present Illness  Mallory MATUTE Winter is a 49 y.o. female presenting with Epigastric pain, Colon cancer screening.     Past Medical History  Medical History[1]  Surgical History  Surgical History[2]  Social History  She reports that she has been smoking cigarettes. She has never used smokeless tobacco. She reports that she does not drink alcohol and does not use drugs.    Family History  Family History[3]     Allergies  Allergies[4]  Review of Systems   Constitutional: Negative.    Respiratory: Negative.     Cardiovascular: Negative.    Endocrine: Negative.    Genitourinary: Negative.    Skin: Negative.    Neurological: Negative.         Physical Exam  Constitutional:       Appearance: Normal appearance.   HENT:      Head: Normocephalic and atraumatic.   Cardiovascular:      Rate and Rhythm: Normal rate and regular rhythm.      Pulses: Normal pulses.      Heart sounds: Normal heart sounds.   Pulmonary:      Effort: Pulmonary effort is normal.      Breath sounds: Normal breath sounds.   Abdominal:      General: Abdomen is flat. Bowel sounds are normal.      Palpations: Abdomen is soft.      Tenderness: There is no abdominal tenderness.   Musculoskeletal:         General: Normal range of motion.      Cervical back: Normal range of motion.   Skin:     General: Skin is warm.   Neurological:      Mental Status: She is alert.          Last Recorded Vitals  There were no vitals taken for this visit.    Assessment/Plan   Epigastric pain, Colon cancer screening.  EGD   Colonoscopy      Markus Schilling MD       [1]   Past Medical History:  Diagnosis Date    Diabetes 1.5, managed as type 2 (Multi)     GERD (gastroesophageal reflux disease)     Hypertension    [2]   Past Surgical History:  Procedure Laterality Date    HYSTERECTOMY      OTHER SURGICAL HISTORY  02/03/2020    Gallbladder surgery   [3]   Family History  Problem Relation Name Age of Onset    Other (htn) Mother      Diabetes Mother     [4]    Allergies  Allergen Reactions    Hydrochlorothiazide Shortness of breath    Amlodipine Itching    Azithromycin Unknown    Bupropion GI Upset    Compazine [Prochlorperazine] Agitation    Lisinopril Cough    Losartan Unknown     dizziness    Verapamil Unknown

## 2025-05-14 NOTE — DISCHARGE INSTRUCTIONS

## 2025-05-16 ENCOUNTER — APPOINTMENT (OUTPATIENT)
Dept: GASTROENTEROLOGY | Facility: HOSPITAL | Age: 50
End: 2025-05-16
Payer: COMMERCIAL

## 2025-05-26 DIAGNOSIS — E11.65 TYPE 2 DIABETES MELLITUS WITH HYPERGLYCEMIA, WITHOUT LONG-TERM CURRENT USE OF INSULIN: ICD-10-CM

## 2025-05-27 RX ORDER — SEMAGLUTIDE 0.68 MG/ML
0.25 INJECTION, SOLUTION SUBCUTANEOUS
Qty: 3 ML | Refills: 0 | Status: SHIPPED | OUTPATIENT
Start: 2025-05-27

## 2025-06-02 LAB
LABORATORY COMMENT REPORT: NORMAL
PATH REPORT.FINAL DX SPEC: NORMAL
PATH REPORT.GROSS SPEC: NORMAL
PATH REPORT.RELEVANT HX SPEC: NORMAL
PATH REPORT.TOTAL CANCER: NORMAL

## 2025-06-05 NOTE — RESULT ENCOUNTER NOTE
During recent upper endoscopy biopsies were taken from the stomach.  Pathology results showed this is normal.  Which means no infection or inflammation was seen.  A polyp was seen in terminal ileum which was biopsied, pathology results showed this is normal.  During recent colonoscopy a polyp was seen and completely removed.  Pathology results show this polyp as tubular adenoma.  This kind of polyp is benign but precancerous.  Based on pathology results I recommend repeating colonoscopy in 7 years.  Do not hesitate to contact me if you have any questions or concerns.  Sincerely,

## 2025-06-12 DIAGNOSIS — E11.9 CONTROLLED TYPE 2 DIABETES MELLITUS WITHOUT COMPLICATION, WITHOUT LONG-TERM CURRENT USE OF INSULIN: Primary | ICD-10-CM

## 2025-07-11 ENCOUNTER — APPOINTMENT (OUTPATIENT)
Dept: PRIMARY CARE | Facility: CLINIC | Age: 50
End: 2025-07-11
Payer: COMMERCIAL

## 2025-07-11 VITALS — SYSTOLIC BLOOD PRESSURE: 122 MMHG | BODY MASS INDEX: 32.61 KG/M2 | DIASTOLIC BLOOD PRESSURE: 87 MMHG | WEIGHT: 190 LBS

## 2025-07-11 DIAGNOSIS — E66.811 OBESITY, CLASS I, BMI 30-34.9: ICD-10-CM

## 2025-07-11 DIAGNOSIS — K21.9 GASTROESOPHAGEAL REFLUX DISEASE, UNSPECIFIED WHETHER ESOPHAGITIS PRESENT: ICD-10-CM

## 2025-07-11 DIAGNOSIS — E11.65 TYPE 2 DIABETES MELLITUS WITH HYPERGLYCEMIA, WITHOUT LONG-TERM CURRENT USE OF INSULIN: Primary | ICD-10-CM

## 2025-07-11 DIAGNOSIS — F17.200 CURRENT SMOKER: ICD-10-CM

## 2025-07-11 DIAGNOSIS — I10 PRIMARY HYPERTENSION: ICD-10-CM

## 2025-07-11 PROCEDURE — 99406 BEHAV CHNG SMOKING 3-10 MIN: CPT | Performed by: STUDENT IN AN ORGANIZED HEALTH CARE EDUCATION/TRAINING PROGRAM

## 2025-07-11 PROCEDURE — 4010F ACE/ARB THERAPY RXD/TAKEN: CPT | Performed by: STUDENT IN AN ORGANIZED HEALTH CARE EDUCATION/TRAINING PROGRAM

## 2025-07-11 PROCEDURE — 3079F DIAST BP 80-89 MM HG: CPT | Performed by: STUDENT IN AN ORGANIZED HEALTH CARE EDUCATION/TRAINING PROGRAM

## 2025-07-11 PROCEDURE — 3074F SYST BP LT 130 MM HG: CPT | Performed by: STUDENT IN AN ORGANIZED HEALTH CARE EDUCATION/TRAINING PROGRAM

## 2025-07-11 PROCEDURE — 99214 OFFICE O/P EST MOD 30 MIN: CPT | Performed by: STUDENT IN AN ORGANIZED HEALTH CARE EDUCATION/TRAINING PROGRAM

## 2025-07-11 RX ORDER — TIRZEPATIDE 2.5 MG/.5ML
2.5 INJECTION, SOLUTION SUBCUTANEOUS WEEKLY
Qty: 2 ML | Refills: 2 | Status: SHIPPED | OUTPATIENT
Start: 2025-07-11

## 2025-07-11 RX ORDER — BUPROPION HYDROCHLORIDE 150 MG/1
150 TABLET ORAL EVERY MORNING
Qty: 30 TABLET | Refills: 1 | Status: SHIPPED | OUTPATIENT
Start: 2025-07-11 | End: 2025-09-09

## 2025-07-11 ASSESSMENT — ENCOUNTER SYMPTOMS
PALPITATIONS: 0
ORTHOPNEA: 0
SWEATS: 1
NECK PAIN: 1
HYPERTENSION: 1
SHORTNESS OF BREATH: 0
BLURRED VISION: 0
PND: 0
HEADACHES: 0

## 2025-07-11 NOTE — PROGRESS NOTES
Subjective   Patient ID: Mallory MATUTE Winter is a 49 y.o. female who presents for Follow-up, stiff neck, and discuss ozempic med.    Mallory is here for follow up visit.    Unable to tolerate side effects of ozempic at 0.5mg dosing, nausea, bloating, GI upset. Has been taking 0.25mg weekly. No change in weight since our last visit. Mounjaro was initially denied by insurance.     Neck stiffness upon waking up 1.5 weeks ago, improving steadily. No injury, change in activity. Icy hot with relief.    She has been taking her valsartan, BP has been well controlled recently. She has been taking her PPI with good control of acid reflux. She was evaluated by GI for EGD, colonoscopy.     Review of Systems   Respiratory:  Negative for shortness of breath.    Cardiovascular:  Negative for chest pain and palpitations.   Musculoskeletal:  Positive for neck pain.   Neurological:  Negative for headaches.     12-point ROS was reviewed and is negative, unless otherwise noted in HPI    Objective   Vitals:    07/11/25 1258   BP: 122/87      Physical Exam  GEN: alert, conversant, NAD  HEENT: PERRL, EOMI, MMM, Tms pearly gray bilaterally  NECK: supple, no LAD appreciated  CHEST: CTAB  CV: S1, S2, RRR, no murmurs appreciated  ABD: soft, NT, ND  EXT: no significant LE edema  SKIN: warm, dry    Assessment/Plan   # Diabetes Mellitus type 2  #obesity, class I  Most Recent HgbA1c: 6.4% (12/2024, repeat today)  Had been following [facility] management [facility]  Previously on Metformin, Ozempic. Unable to tolerate side effects of higher dose of oxempic.  - trial low dose mounjaro 2.5mg weekly - referral to clinical pharmacy to help with approval  - discussed SE profile. Discussed need for prophylactic bowel regimen.  Lipid panel, microalbumin checked less than a year ago.  Increase dietary efforts and physical activity.  Routine diabetic retinopathy screening, foot exam/monofilament testing screening: up-to-date.    # HTN  Well controlled  Continue  valsartan to 80mg daily  Discussed DASH diet and dietary sodium restrictions.   Increase dietary efforts and physical activity.     #refractory GERD  EGD/Colonoscopy 2025  - maintained on protonix 40mg daily without relief. Previosuly on omeprazole, esomeprazole, pepcid without relief.  - continue deslanzoprazole 30mg daily  - Following with GI    #migraine headaches, controlled  - maintained on emgality monthly, propranolol daily  - establishing with  neurology in 9/2025    #acne  - maintained on spironolactone daily  - following with dermatology    #Current smoker  - 1/2 ppd smoker. 30 pack year hx  - Advised cessation, counseled cessation tips, offer cessation aids  - Spent >5 minutes counseling smoking cessation  - trial wellbutrin 150mg daily, SE profile discussed    Health Maintenance:  Vaccines: COVID (x3), Flu (sometimes), TDAP (2021), pneumonia (x1)  Screening: Colonoscopy (5/2025, Mammogram (12/2024), Paptest (s/p hysterectomy)   Labs: Hgba1c      RTC in 3-4 months, or sooner PRN    Aman Mims, DO

## 2025-07-12 LAB
EST. AVERAGE GLUCOSE BLD GHB EST-MCNC: 134 MG/DL
EST. AVERAGE GLUCOSE BLD GHB EST-SCNC: 7.4 MMOL/L
HBA1C MFR BLD: 6.3 %

## 2025-07-14 DIAGNOSIS — E11.65 TYPE 2 DIABETES MELLITUS WITH HYPERGLYCEMIA, WITHOUT LONG-TERM CURRENT USE OF INSULIN: Primary | ICD-10-CM

## 2025-07-17 ENCOUNTER — TELEMEDICINE (OUTPATIENT)
Dept: PHARMACY | Facility: HOSPITAL | Age: 50
End: 2025-07-17
Payer: COMMERCIAL

## 2025-07-17 DIAGNOSIS — E11.9 CONTROLLED TYPE 2 DIABETES MELLITUS WITHOUT COMPLICATION, WITHOUT LONG-TERM CURRENT USE OF INSULIN: Primary | ICD-10-CM

## 2025-07-17 NOTE — PROGRESS NOTES
Clinical Pharmacy Appointment    Patient ID: Mallory Casillas is a 49 y.o. female who presents for Diabetes.    Pt is here for First appointment.     Referring Provider: Aman Mims DO  PCP: Aman Mims DO  Last visit with PCP: 7/11/25     Subjective     HPI  DIABETES MELLITUS TYPE 2:       Current diabetic medications include:  Stopped taking ozempic 0.25 mg once weekly    Clarifications to above regimen: PA approved for rybelsus  Adverse Effects: GI upset    Past diabetic medications include:  Metformin, ozempic  Did not tolerate    Pertinent PMH Review:  PMH of Pancreatitis: No  PMH of Retinopathy: No  PMH of MTC: No  PMH of MEN2: No  UACR/EGFR in last year?: No  Albumin/Creatine Ratio   Date Value Ref Range Status   05/29/2021 7.0 0.0 - 30.0 ug/mg crt Final           Objective   Allergies[1]  Social History     Social History Narrative    Not on file      Medication Review  Current Outpatient Medications   Medication Instructions    buPROPion XL (WELLBUTRIN XL) 150 mg, oral, Every morning, Do not crush, chew, or split.    dexlansoprazole (DEXILANT) 30 mg, oral, Daily, Do not crush or chew.    doxycycline (PERIOSTAT) 20 mg, 2 times daily    Emgality Pen 120 mg, subcutaneous, Every 30 days    estradiol (Vivelle-DOT) 0.1 mg/24 hr patch 1 patch, 2 times weekly    pantoprazole (ProtoNix) 40 mg EC tablet     propranolol XL (INNOPRAN XL) 120 mg, Daily    semaglutide (RYBELSUS) 3 mg, oral, Daily    spironolactone (ALDACTONE) 100 mg, Daily RT    valsartan (DIOVAN) 80 mg, oral, Daily      Vitals  BP Readings from Last 2 Encounters:   07/11/25 122/87   05/14/25 118/76     BMI Readings from Last 1 Encounters:   07/11/25 32.61 kg/m²      Labs  A1C  Lab Results   Component Value Date    HGBA1C 6.3 (H) 07/11/2025    HGBA1C 6.5 (H) 12/13/2024    HGBA1C 6.4 (H) 09/09/2024     BMP  Lab Results   Component Value Date    CALCIUM 8.9 12/23/2024     (L) 12/23/2024    K 3.5 12/23/2024    CO2 24 12/23/2024      12/23/2024    BUN 16 12/23/2024    CREATININE 0.71 12/23/2024    EGFR >90 12/23/2024     LFTs  Lab Results   Component Value Date    ALT 16 12/23/2024    AST 14 12/23/2024    ALKPHOS 51 12/23/2024    BILITOT 0.8 12/23/2024     FLP  Lab Results   Component Value Date    TRIG 43 05/29/2021    CHOL 121 05/29/2021    LDLF 66 05/29/2021    HDL 46.1 05/29/2021     Urine Microalbumin  Lab Results   Component Value Date    MICROALBCREA 7.0 05/29/2021     Weight Management  Wt Readings from Last 3 Encounters:   07/11/25 86.2 kg (190 lb)   04/10/25 85.9 kg (189 lb 6.4 oz)   04/09/25 85.7 kg (189 lb)      There is no height or weight on file to calculate BMI.     Assessment/Plan   Problem List Items Addressed This Visit    None    Rationale: Patient has been mostly controlled with diet and exercise, History of metformin and ozempic but does not tolerate. Insurance will not cover mounjaro until alternative options have been attempted including other glp1ra and sglt2i. PA for rybelsus approved. Will follow up with patient in 2 weeks to see if tolerates and discuss plan moving forward.     Medication changes:  START:  Rybelsus 3 mg by mouth once daily    Clinical Pharmacist follow-up: 2 weeks, Telehealth visit    Continue all meds under the continuation of care with the referring provider and clinical pharmacy team.    Thank you,  Woodrow Ham, PharmD  Clinical Pharmacy Specialist, Primary Care  [phone]      Verbal consent to manage patient's drug therapy was obtained from the patient. They were informed they may decline to participate or withdraw from participation in pharmacy services at any time.           [1]   Allergies  Allergen Reactions    Hydrochlorothiazide Shortness of breath    Amlodipine Itching    Azithromycin Unknown    Bupropion GI Upset    Compazine [Prochlorperazine] Agitation    Lisinopril Cough    Losartan Unknown     dizziness    Verapamil Unknown

## 2025-07-31 ENCOUNTER — APPOINTMENT (OUTPATIENT)
Dept: PHARMACY | Facility: HOSPITAL | Age: 50
End: 2025-07-31
Payer: COMMERCIAL

## 2025-08-07 ENCOUNTER — TELEMEDICINE (OUTPATIENT)
Dept: PHARMACY | Facility: HOSPITAL | Age: 50
End: 2025-08-07
Payer: COMMERCIAL

## 2025-08-07 DIAGNOSIS — E11.9 CONTROLLED TYPE 2 DIABETES MELLITUS WITHOUT COMPLICATION, WITHOUT LONG-TERM CURRENT USE OF INSULIN: Primary | ICD-10-CM

## 2025-08-07 NOTE — PROGRESS NOTES
Clinical Pharmacy Appointment    Patient ID: Mallory Casillas is a 49 y.o. female who presents for Diabetes and Weight Loss.    Pt is here for Follow Up appointment.     Referring Provider: Aman Mims DO  PCP: Aman Mims DO  Last visit with PCP: 7/11/25     Subjective     HPI  DIABETES MELLITUS TYPE 2:    Diagnosed with diabetes:  2020+.   Known diabetic complications: n/a.  Does patient follow with Endocrinology: No  Last optometry exam: Not up to date  Most recent visit in Podiatry: Not to date-- patient denies sores or cuts on feet today      Current diabetic medications include:  Rybelsus 3 mg once daily 30 minutes prior to food or drink    Clarifications to above regimen: none  Adverse Effects: tolerating    Past diabetic medications include:  Ozempic - did not tolerate    Glucose Readings:  Glucometer/CGM Type: glucometer  Patient does not test    Lifestyle:  Diet: 2 meals/day.   BK: Coffee  LN: Salad, sandwich, lean cuisine  DN: Protein, vegetable, starch/carb  Snacks: Fruit, protein shake  Drinks: water  Exercise: does not exercise  Used to walk occasionally  Is limited by: shoulder injury    Secondary Prevention:  Statin? No  ACE-I/ARB? Yes  Aspirin? No    Pertinent PMH Review:  PMH of Pancreatitis: No  PMH of Retinopathy: No  PMH of Urinary Tract Infections: No  PMH of MTC: No  PMH of MEN2: No  UACR/EGFR in last year?: No  Albumin/Creatine Ratio   Date Value Ref Range Status   05/29/2021 7.0 0.0 - 30.0 ug/mg crt Final       Objective   Allergies[1]  Social History     Social History Narrative    Not on file      Medication Review  Current Outpatient Medications   Medication Instructions    buPROPion XL (WELLBUTRIN XL) 150 mg, oral, Every morning, Do not crush, chew, or split.    dexlansoprazole (DEXILANT) 30 mg, oral, Daily, Do not crush or chew.    doxycycline (PERIOSTAT) 20 mg, 2 times daily    Emgality Pen 120 mg, subcutaneous, Every 30 days    estradiol (Vivelle-DOT) 0.1 mg/24 hr patch 1  patch, 2 times weekly    pantoprazole (ProtoNix) 40 mg EC tablet     propranolol XL (INNOPRAN XL) 120 mg, Daily    semaglutide (RYBELSUS) 3 mg, oral, Daily    spironolactone (ALDACTONE) 100 mg, Daily RT    valsartan (DIOVAN) 80 mg, oral, Daily      Vitals  BP Readings from Last 2 Encounters:   07/11/25 122/87   05/14/25 118/76     BMI Readings from Last 1 Encounters:   07/11/25 32.61 kg/m²      Labs  A1C  Lab Results   Component Value Date    HGBA1C 6.3 (H) 07/11/2025    HGBA1C 6.5 (H) 12/13/2024    HGBA1C 6.4 (H) 09/09/2024     BMP  Lab Results   Component Value Date    CALCIUM 8.9 12/23/2024     (L) 12/23/2024    K 3.5 12/23/2024    CO2 24 12/23/2024     12/23/2024    BUN 16 12/23/2024    CREATININE 0.71 12/23/2024    EGFR >90 12/23/2024     LFTs  Lab Results   Component Value Date    ALT 16 12/23/2024    AST 14 12/23/2024    ALKPHOS 51 12/23/2024    BILITOT 0.8 12/23/2024     FLP  Lab Results   Component Value Date    TRIG 43 05/29/2021    CHOL 121 05/29/2021    LDLF 66 05/29/2021    HDL 46.1 05/29/2021     Urine Microalbumin  Lab Results   Component Value Date    MICROALBCREA 7.0 05/29/2021     Weight Management  Wt Readings from Last 3 Encounters:   07/11/25 86.2 kg (190 lb)   04/10/25 85.9 kg (189 lb 6.4 oz)   04/09/25 85.7 kg (189 lb)      There is no height or weight on file to calculate BMI.     Assessment/Plan   Problem List Items Addressed This Visit    None    Rationale: Patient has been mostly controlled with diet and exercise, History of metformin and ozempic but does not tolerate. Insurance will not cover mounjaro until alternative options have been attempted including other glp1ra and sglt2i. PA for rybelsus approved and has been tolerating 3 mg at this time. SMBG are unknown and has seen limited weight loss. Will increase dose and follow up with patient in 4 weeks.    Medication changes:  INCREASE:  Rybelsus to 7 mg by mouth once daily    Clinical Pharmacist follow-up: 4 weeks,  Telehealth visit    Continue all meds under the continuation of care with the referring provider and clinical pharmacy team.    Thank you,  Woodrow Ham, PharmD  Clinical Pharmacy Specialist, Primary Care  [phone]      Verbal consent to manage patient's drug therapy was obtained from the patient. They were informed they may decline to participate or withdraw from participation in pharmacy services at any time.           [1]   Allergies  Allergen Reactions    Hydrochlorothiazide Shortness of breath    Amlodipine Itching    Azithromycin Unknown    Bupropion GI Upset    Compazine [Prochlorperazine] Agitation    Lisinopril Cough    Losartan Unknown     dizziness    Verapamil Unknown

## 2025-08-12 PROCEDURE — RXMED WILLOW AMBULATORY MEDICATION CHARGE

## 2025-08-18 ENCOUNTER — PHARMACY VISIT (OUTPATIENT)
Dept: PHARMACY | Facility: CLINIC | Age: 50
End: 2025-08-18
Payer: COMMERCIAL

## 2025-08-28 ENCOUNTER — APPOINTMENT (OUTPATIENT)
Dept: OPHTHALMOLOGY | Facility: CLINIC | Age: 50
End: 2025-08-28
Payer: COMMERCIAL

## 2025-08-29 ENCOUNTER — OFFICE VISIT (OUTPATIENT)
Dept: NEUROLOGY | Facility: CLINIC | Age: 50
End: 2025-08-29
Payer: COMMERCIAL

## 2025-08-29 VITALS
WEIGHT: 190 LBS | HEIGHT: 64 IN | DIASTOLIC BLOOD PRESSURE: 84 MMHG | HEART RATE: 66 BPM | SYSTOLIC BLOOD PRESSURE: 127 MMHG | BODY MASS INDEX: 32.44 KG/M2

## 2025-08-29 DIAGNOSIS — Z86.69 HX OF MIGRAINE HEADACHES: ICD-10-CM

## 2025-08-29 DIAGNOSIS — G43.109 MIGRAINE WITH AURA AND WITHOUT STATUS MIGRAINOSUS, NOT INTRACTABLE: Primary | ICD-10-CM

## 2025-08-29 PROCEDURE — 4010F ACE/ARB THERAPY RXD/TAKEN: CPT | Performed by: NURSE PRACTITIONER

## 2025-08-29 PROCEDURE — 3074F SYST BP LT 130 MM HG: CPT | Performed by: NURSE PRACTITIONER

## 2025-08-29 PROCEDURE — 99204 OFFICE O/P NEW MOD 45 MIN: CPT | Performed by: NURSE PRACTITIONER

## 2025-08-29 PROCEDURE — 3079F DIAST BP 80-89 MM HG: CPT | Performed by: NURSE PRACTITIONER

## 2025-08-29 PROCEDURE — 3008F BODY MASS INDEX DOCD: CPT | Performed by: NURSE PRACTITIONER

## 2025-08-29 RX ORDER — GALCANEZUMAB 120 MG/ML
120 INJECTION, SOLUTION SUBCUTANEOUS
Qty: 1 EACH | Refills: 5 | Status: SHIPPED | OUTPATIENT
Start: 2025-08-29 | End: 2026-02-25

## 2025-08-29 RX ORDER — ELETRIPTAN HYDROBROMIDE 40 MG/1
40 TABLET, FILM COATED ORAL ONCE AS NEEDED
Qty: 9 TABLET | Refills: 5 | Status: SHIPPED | OUTPATIENT
Start: 2025-08-29

## 2025-08-29 ASSESSMENT — PATIENT HEALTH QUESTIONNAIRE - PHQ9
2. FEELING DOWN, DEPRESSED OR HOPELESS: NOT AT ALL
1. LITTLE INTEREST OR PLEASURE IN DOING THINGS: NOT AT ALL
SUM OF ALL RESPONSES TO PHQ9 QUESTIONS 1 AND 2: 0

## 2025-08-29 ASSESSMENT — ANXIETY QUESTIONNAIRES
1. FEELING NERVOUS, ANXIOUS, OR ON EDGE: NOT AT ALL
5. BEING SO RESTLESS THAT IT IS HARD TO SIT STILL: NOT AT ALL
6. BECOMING EASILY ANNOYED OR IRRITABLE: NOT AT ALL
3. WORRYING TOO MUCH ABOUT DIFFERENT THINGS: NOT AT ALL
7. FEELING AFRAID AS IF SOMETHING AWFUL MIGHT HAPPEN: NOT AT ALL
IF YOU CHECKED OFF ANY PROBLEMS ON THIS QUESTIONNAIRE, HOW DIFFICULT HAVE THESE PROBLEMS MADE IT FOR YOU TO DO YOUR WORK, TAKE CARE OF THINGS AT HOME, OR GET ALONG WITH OTHER PEOPLE: NOT DIFFICULT AT ALL
2. NOT BEING ABLE TO STOP OR CONTROL WORRYING: NOT AT ALL
GAD7 TOTAL SCORE: 0
4. TROUBLE RELAXING: NOT AT ALL

## 2025-08-29 ASSESSMENT — ENCOUNTER SYMPTOMS
OCCASIONAL FEELINGS OF UNSTEADINESS: 0
DEPRESSION: 0
LOSS OF SENSATION IN FEET: 0

## 2025-09-02 ENCOUNTER — SPECIALTY PHARMACY (OUTPATIENT)
Dept: PHARMACY | Facility: CLINIC | Age: 50
End: 2025-09-02

## 2025-09-02 DIAGNOSIS — G43.109 MIGRAINE WITH AURA AND WITHOUT STATUS MIGRAINOSUS, NOT INTRACTABLE: Primary | ICD-10-CM

## 2025-09-04 ENCOUNTER — APPOINTMENT (OUTPATIENT)
Dept: PHARMACY | Facility: HOSPITAL | Age: 50
End: 2025-09-04
Payer: COMMERCIAL

## 2025-09-12 ENCOUNTER — APPOINTMENT (OUTPATIENT)
Dept: NEUROLOGY | Facility: CLINIC | Age: 50
End: 2025-09-12
Payer: COMMERCIAL

## 2025-10-02 ENCOUNTER — APPOINTMENT (OUTPATIENT)
Dept: PHARMACY | Facility: HOSPITAL | Age: 50
End: 2025-10-02
Payer: COMMERCIAL

## 2025-11-14 ENCOUNTER — APPOINTMENT (OUTPATIENT)
Dept: PRIMARY CARE | Facility: CLINIC | Age: 50
End: 2025-11-14
Payer: COMMERCIAL